# Patient Record
Sex: FEMALE | Race: WHITE | Employment: OTHER | URBAN - NONMETROPOLITAN AREA
[De-identification: names, ages, dates, MRNs, and addresses within clinical notes are randomized per-mention and may not be internally consistent; named-entity substitution may affect disease eponyms.]

---

## 2017-05-22 ENCOUNTER — HOSPITAL ENCOUNTER (OUTPATIENT)
Dept: WOMENS IMAGING | Age: 77
Discharge: HOME | End: 2017-05-22
Payer: MEDICARE

## 2017-05-22 DIAGNOSIS — Z12.31 VISIT FOR SCREENING MAMMOGRAM: ICD-10-CM

## 2017-05-22 PROCEDURE — 77063 BREAST TOMOSYNTHESIS BI: CPT

## 2017-06-08 RX ORDER — GABAPENTIN 300 MG/1
300 CAPSULE ORAL DAILY
COMMUNITY
Start: 2017-05-07 | End: 2017-06-08 | Stop reason: SDUPTHER

## 2017-06-08 RX ORDER — GABAPENTIN 300 MG/1
300 CAPSULE ORAL DAILY
Qty: 90 CAPSULE | Refills: 3 | Status: SHIPPED | OUTPATIENT
Start: 2017-06-08 | End: 2017-11-03

## 2017-07-14 RX ORDER — OMEPRAZOLE 20 MG/1
40 CAPSULE, DELAYED RELEASE ORAL DAILY
COMMUNITY

## 2017-07-14 RX ORDER — SIMVASTATIN 20 MG
20 TABLET ORAL NIGHTLY
COMMUNITY

## 2017-07-14 RX ORDER — ENALAPRIL MALEATE AND HYDROCHLOROTHIAZIDE 10; 25 MG/1; MG/1
1 TABLET ORAL DAILY
COMMUNITY

## 2017-07-14 RX ORDER — FLUOXETINE 10 MG/1
10 CAPSULE ORAL DAILY
Status: ON HOLD | COMMUNITY
End: 2017-08-24

## 2017-08-08 ENCOUNTER — OFFICE VISIT (OUTPATIENT)
Dept: GASTROENTEROLOGY | Facility: CLINIC | Age: 77
End: 2017-08-08

## 2017-08-08 VITALS
HEIGHT: 65 IN | DIASTOLIC BLOOD PRESSURE: 70 MMHG | HEART RATE: 87 BPM | BODY MASS INDEX: 36.65 KG/M2 | WEIGHT: 220 LBS | SYSTOLIC BLOOD PRESSURE: 124 MMHG | TEMPERATURE: 98 F

## 2017-08-08 DIAGNOSIS — K62.5 RECTAL BLEEDING: Primary | ICD-10-CM

## 2017-08-08 DIAGNOSIS — K63.5 COLON POLYPS: ICD-10-CM

## 2017-08-08 PROCEDURE — 99214 OFFICE O/P EST MOD 30 MIN: CPT | Performed by: NURSE PRACTITIONER

## 2017-08-08 RX ORDER — GABAPENTIN 300 MG/1
300 CAPSULE ORAL 3 TIMES DAILY
COMMUNITY

## 2017-08-08 NOTE — PROGRESS NOTES
"Chief Complaint   Patient presents with   • Rectal Bleeding     had bright red blood in stool couple times last 2 months       Subjective     HPI    Bright red blood observed in stool twice over past 6 weeks.  Bleeding is new.  No associated abdominal pain.  No constipation or diarrhea at time of bleeding. Normal bowel habit described as moving every other day without difficulty.  Blood described as \"dripping in commode.\"  No weight loss.  No N/V.  No heartburn or indigestion.  Heartburn is controlled with use of Omeprazole.      CScope (Dr Chavez) 1/2016 tubular adenoma at 30 cm  Endoscopy (Dr Chavez) 1/2016 gastric polyps      Past Medical History:   Diagnosis Date   • Depression    • Hyperlipidemia    • Hypertension        Past Surgical History:   Procedure Laterality Date   • CHOLECYSTECTOMY     • COLONOSCOPY  01/19/2016    small benign-appering polyp at 30cm   • ENDOSCOPY  01/19/2016    gastric polyp's   • LAPAROSCOPIC TUBAL LIGATION         Outpatient Prescriptions Marked as Taking for the 8/8/17 encounter (Office Visit) with BE Cuello   Medication Sig Dispense Refill   • enalapril-hydrochlorothiazide (VASERETIC) 10-25 MG per tablet Take 1 tablet by mouth Daily.     • gabapentin (NEURONTIN) 300 MG capsule Take 300 mg by mouth 3 (Three) Times a Day.     • Multiple Vitamin (MULTI VITAMIN PO) Take  by mouth.     • omeprazole (priLOSEC) 20 MG capsule Take 40 mg by mouth Daily.     • simvastatin (ZOCOR) 20 MG tablet Take 20 mg by mouth Every Night.         No Known Allergies    Social History     Social History   • Marital status:      Spouse name: N/A   • Number of children: N/A   • Years of education: N/A     Occupational History   • Not on file.     Social History Main Topics   • Smoking status: Never Smoker   • Smokeless tobacco: Never Used   • Alcohol use No   • Drug use: No   • Sexual activity: Not on file     Other Topics Concern   • Not on file     Social History Narrative " "      Family History   Problem Relation Age of Onset   • Esophageal cancer Paternal Grandfather    • Colon cancer Neg Hx        Review of Systems   Constitutional: Negative for fatigue, fever and unexpected weight change.   HENT: Negative for hearing loss, sore throat and voice change.    Eyes: Negative for visual disturbance.   Respiratory: Negative for cough, shortness of breath and wheezing.    Cardiovascular: Negative for chest pain and palpitations.   Gastrointestinal: Positive for anal bleeding. Negative for abdominal pain, blood in stool and vomiting.   Endocrine: Negative for polydipsia and polyuria.   Genitourinary: Negative for difficulty urinating, dysuria, hematuria and urgency.   Musculoskeletal: Negative for joint swelling and myalgias.   Skin: Negative for color change, rash and wound.   Neurological: Negative for dizziness, tremors, seizures and syncope.   Hematological: Does not bruise/bleed easily.   Psychiatric/Behavioral: Negative for agitation and confusion. The patient is not nervous/anxious.        Objective     Vitals:    08/08/17 0936   BP: 124/70   Pulse: 87   Temp: 98 °F (36.7 °C)   Weight: 220 lb (99.8 kg)   Height: 65\" (165.1 cm)     Body mass index is 36.61 kg/(m^2).    Physical Exam   Constitutional: She is oriented to person, place, and time. She appears well-developed and well-nourished.   HENT:   Head: Normocephalic and atraumatic.   Eyes: Conjunctivae are normal. Pupils are equal, round, and reactive to light. No scleral icterus.   Neck: No JVD present. No thyroid mass and no thyromegaly present.   Cardiovascular: Normal rate, regular rhythm and normal heart sounds.  Exam reveals no gallop and no friction rub.    No murmur heard.  Pulmonary/Chest: Effort normal and breath sounds normal. No accessory muscle usage. No respiratory distress. She has no wheezes. She has no rales.   Abdominal: Soft. Bowel sounds are normal. She exhibits no distension, no ascites and no mass. There is no " splenomegaly or hepatomegaly. There is no tenderness. There is no rebound and no guarding.   Genitourinary:   Genitourinary Comments: Rectal-Did not examine   Musculoskeletal: Normal range of motion. She exhibits no edema.   Neurological: She is alert and oriented to person, place, and time.   Deemed a reliable historian, able to converse without difficulty and able to move all extremities without difficulty   Skin: Skin is warm and dry.   Psychiatric: She has a normal mood and affect. Her behavior is normal.       Imaging Results (most recent)     None          Assessment/Plan     Luda was seen today for rectal bleeding.    Diagnoses and all orders for this visit:    Rectal bleeding  -     Case Request; Standing  -     Implement Anesthesia Orders Day of Procedure; Standing  -     Obtain Informed Consent; Standing  -     Case Request    Colon polyps      COLONOSCOPY WITH ANESTHESIA (N/A)    Declined Golytely  Educated on Miralax/Gatorade prep    Advised pt to stop ASA day prior to procedure and to stop use of NSAIDs, Fish Oil, and MV 5 days prior to procedure.  Tylenol based products are ok to take.  Pt verbalized understanding.    All risks, benefits, alternatives, and indications of colonoscopy procedure have been discussed with the patient. Risks to include perforation of the colon requiring possible surgery or colostomy, risk of bleeding from biopsies or removal of colon tissue, possibility of missing a colon polyp or cancer, or adverse drug reaction.  Benefits to include the diagnosis and management of disease of the colon and rectum. Alternatives to include barium enema, radiographic evaluation, lab testing or no intervention. Pt verbalizes understanding and agrees to proceed with procedure.      There are no Patient Instructions on file for this visit.

## 2017-08-09 PROBLEM — K62.5 RECTAL BLEEDING: Status: ACTIVE | Noted: 2017-08-09

## 2017-08-24 ENCOUNTER — ANESTHESIA (OUTPATIENT)
Dept: GASTROENTEROLOGY | Facility: HOSPITAL | Age: 77
End: 2017-08-24

## 2017-08-24 ENCOUNTER — ANESTHESIA EVENT (OUTPATIENT)
Dept: GASTROENTEROLOGY | Facility: HOSPITAL | Age: 77
End: 2017-08-24

## 2017-08-24 ENCOUNTER — HOSPITAL ENCOUNTER (OUTPATIENT)
Facility: HOSPITAL | Age: 77
Setting detail: HOSPITAL OUTPATIENT SURGERY
Discharge: HOME | End: 2017-08-24
Attending: INTERNAL MEDICINE | Admitting: INTERNAL MEDICINE

## 2017-08-24 VITALS
DIASTOLIC BLOOD PRESSURE: 71 MMHG | HEART RATE: 73 BPM | SYSTOLIC BLOOD PRESSURE: 120 MMHG | BODY MASS INDEX: 38.65 KG/M2 | OXYGEN SATURATION: 97 % | RESPIRATION RATE: 20 BRPM | TEMPERATURE: 98.1 F | WEIGHT: 232 LBS | HEIGHT: 65 IN

## 2017-08-24 DIAGNOSIS — K62.5 RECTAL BLEEDING: ICD-10-CM

## 2017-08-24 PROCEDURE — 45378 DIAGNOSTIC COLONOSCOPY: CPT | Performed by: INTERNAL MEDICINE

## 2017-08-24 PROCEDURE — 25010000002 PROPOFOL 10 MG/ML EMULSION: Performed by: NURSE ANESTHETIST, CERTIFIED REGISTERED

## 2017-08-24 RX ORDER — SODIUM CHLORIDE 9 MG/ML
500 INJECTION, SOLUTION INTRAVENOUS CONTINUOUS PRN
Status: DISCONTINUED | OUTPATIENT
Start: 2017-08-24 | End: 2017-08-24 | Stop reason: HOSPADM

## 2017-08-24 RX ORDER — LIDOCAINE HYDROCHLORIDE 20 MG/ML
INJECTION, SOLUTION INFILTRATION; PERINEURAL AS NEEDED
Status: DISCONTINUED | OUTPATIENT
Start: 2017-08-24 | End: 2017-08-24 | Stop reason: SURG

## 2017-08-24 RX ORDER — LIDOCAINE HYDROCHLORIDE 10 MG/ML
0.5 INJECTION, SOLUTION INFILTRATION; PERINEURAL ONCE AS NEEDED
Status: DISCONTINUED | OUTPATIENT
Start: 2017-08-24 | End: 2017-08-24

## 2017-08-24 RX ORDER — SODIUM CHLORIDE 9 MG/ML
100 INJECTION, SOLUTION INTRAVENOUS CONTINUOUS
Status: CANCELLED | OUTPATIENT
Start: 2017-08-24

## 2017-08-24 RX ORDER — SODIUM CHLORIDE 0.9 % (FLUSH) 0.9 %
1-10 SYRINGE (ML) INJECTION AS NEEDED
Status: CANCELLED | OUTPATIENT
Start: 2017-08-24

## 2017-08-24 RX ORDER — PROPOFOL 10 MG/ML
VIAL (ML) INTRAVENOUS AS NEEDED
Status: DISCONTINUED | OUTPATIENT
Start: 2017-08-24 | End: 2017-08-24 | Stop reason: SURG

## 2017-08-24 RX ORDER — SODIUM CHLORIDE 0.9 % (FLUSH) 0.9 %
3 SYRINGE (ML) INJECTION AS NEEDED
Status: DISCONTINUED | OUTPATIENT
Start: 2017-08-24 | End: 2017-08-24 | Stop reason: HOSPADM

## 2017-08-24 RX ADMIN — PROPOFOL 200 MG: 10 INJECTION, EMULSION INTRAVENOUS at 09:32

## 2017-08-24 RX ADMIN — SODIUM CHLORIDE 500 ML: 9 INJECTION, SOLUTION INTRAVENOUS at 08:36

## 2017-08-24 RX ADMIN — LIDOCAINE HYDROCHLORIDE 50 MG: 20 INJECTION, SOLUTION INFILTRATION; PERINEURAL at 09:32

## 2017-08-24 NOTE — PLAN OF CARE
Problem: Patient Care Overview (Adult)  Goal: Adult Individualization and Mutuality  Outcome: Ongoing (interventions implemented as appropriate)    08/24/17 0806   Individualization   Patient Specific Preferences none   Patient Specific Goals none   Patient Specific Interventions none   Mutuality/Individual Preferences   What Anxieties, Fears or Concerns Do You Have About Your Health or Care? none   What Questions Do You Have About Your Health or Care? none   What Information Would Help Us Give You More Personalized Care? none

## 2017-08-24 NOTE — ANESTHESIA PREPROCEDURE EVALUATION
Anesthesia Evaluation     Patient summary reviewed and Nursing notes reviewed   no history of anesthetic complications:  NPO Solid Status: > 8 hours  NPO Liquid Status: > 8 hours     Airway   Mallampati: III  TM distance: >3 FB  Neck ROM: full  Dental    (+) lower dentures and upper dentures    Pulmonary - normal exam    breath sounds clear to auscultation  (-) asthma, recent URI, sleep apnea, not a smoker  Cardiovascular   Exercise tolerance: good (4-7 METS)    Rhythm: regular  Rate: normal    (+) hypertension well controlled, hyperlipidemia  (-) past MI, angina, murmur, cardiac stents      Neuro/Psych  (-) seizures, TIA, CVA  GI/Hepatic/Renal/Endo    (+) obesity, morbid obesity, GERD well controlled,   (-) liver disease, no renal disease, diabetes, hypothyroidism, hyperthyroidism    Musculoskeletal     (-) neck pain, neck stiffness  Abdominal    Substance History      OB/GYN          Other                                        Anesthesia Plan    ASA 3     general   total IV anesthesia  intravenous induction   Anesthetic plan and risks discussed with patient.

## 2017-08-24 NOTE — PLAN OF CARE
Problem: Patient Care Overview (Adult)  Goal: Plan of Care Review  Outcome: Outcome(s) achieved Date Met:  08/24/17 08/24/17 0953   Patient Care Overview   Progress improving   Outcome Evaluation   Outcome Summary/Follow up Plan DISCHARGE CRITERIA MET   Coping/Psychosocial Response Interventions   Plan Of Care Reviewed With patient;spouse

## 2017-08-24 NOTE — PLAN OF CARE
Problem: Patient Care Overview (Adult)  Goal: Plan of Care Review  Outcome: Ongoing (interventions implemented as appropriate)    08/24/17 0933   Patient Care Overview   Progress improving   Outcome Evaluation   Outcome Summary/Follow up Plan no noted problems

## 2017-08-24 NOTE — PLAN OF CARE
Problem: GI Endoscopy (Adult)  Goal: Signs and Symptoms of Listed Potential Problems Will be Absent or Manageable (GI Endoscopy)  Outcome: Outcome(s) achieved Date Met:  08/24/17 08/24/17 0994   GI Endoscopy   Problems Assessed (GI Endoscopy) all   Problems Present (GI Endoscopy) none

## 2017-08-24 NOTE — ANESTHESIA POSTPROCEDURE EVALUATION
Patient: Luda Walter    Procedure Summary     Date Anesthesia Start Anesthesia Stop Room / Location    08/24/17 0930 0945  [facility] /  [facility]       Procedure Diagnosis Surgeon Provider    COLONOSCOPY WITH ANESTHESIA (N/A ) Rectal bleeding  (Rectal bleeding [K62.5]) Onel Chavez, DO Jayce Johns, MARGIE          Anesthesia Type: general  Last vitals  BP   120/71 (08/24/17 1017)    Temp        Pulse   73 (08/24/17 1017)   Resp   20 (08/24/17 1017)    SpO2   97 % (08/24/17 1017)      Post Anesthesia Care and Evaluation    Patient location during evaluation: PHASE II  Patient participation: complete - patient participated  Level of consciousness: awake and alert  Pain management: adequate  Airway patency: patent  Anesthetic complications: No anesthetic complications  PONV Status: none  Cardiovascular status: acceptable  Respiratory status: acceptable  Hydration status: acceptable

## 2017-08-25 ENCOUNTER — TELEPHONE (OUTPATIENT)
Dept: GASTROENTEROLOGY | Facility: CLINIC | Age: 77
End: 2017-08-25

## 2017-10-17 RX ORDER — SIMVASTATIN 20 MG
20 TABLET ORAL NIGHTLY
COMMUNITY
End: 2018-06-25 | Stop reason: SDUPTHER

## 2017-10-17 RX ORDER — OMEPRAZOLE 20 MG/1
20 CAPSULE, DELAYED RELEASE ORAL 2 TIMES DAILY
COMMUNITY
End: 2017-11-03 | Stop reason: SDUPTHER

## 2017-10-17 RX ORDER — ENALAPRIL MALEATE AND HYDROCHLOROTHIAZIDE 10; 25 MG/1; MG/1
1 TABLET ORAL 2 TIMES DAILY
COMMUNITY
End: 2018-07-10 | Stop reason: SDUPTHER

## 2017-10-23 DIAGNOSIS — E78.2 MIXED HYPERLIPIDEMIA: Primary | ICD-10-CM

## 2017-10-26 DIAGNOSIS — E78.2 MIXED HYPERLIPIDEMIA: ICD-10-CM

## 2017-10-26 LAB
ALBUMIN SERPL-MCNC: 4 G/DL (ref 3.5–5.2)
ALP BLD-CCNC: 58 U/L (ref 35–104)
ALT SERPL-CCNC: 27 U/L (ref 5–33)
ANION GAP SERPL CALCULATED.3IONS-SCNC: 12 MMOL/L (ref 7–19)
AST SERPL-CCNC: 21 U/L (ref 5–32)
BILIRUB SERPL-MCNC: 0.4 MG/DL (ref 0.2–1.2)
BUN BLDV-MCNC: 13 MG/DL (ref 8–23)
CALCIUM SERPL-MCNC: 9.4 MG/DL (ref 8.8–10.2)
CHLORIDE BLD-SCNC: 96 MMOL/L (ref 98–111)
CO2: 32 MMOL/L (ref 22–29)
CREAT SERPL-MCNC: 0.6 MG/DL (ref 0.5–0.9)
GFR NON-AFRICAN AMERICAN: >60
GLUCOSE BLD-MCNC: 104 MG/DL (ref 74–109)
HCT VFR BLD CALC: 43.2 % (ref 37–47)
HEMOGLOBIN: 13.9 G/DL (ref 12–16)
LDL CHOLESTEROL DIRECT: 105 MG/DL
MCH RBC QN AUTO: 28.4 PG (ref 27–31)
MCHC RBC AUTO-ENTMCNC: 32.2 G/DL (ref 33–37)
MCV RBC AUTO: 88.3 FL (ref 81–99)
PDW BLD-RTO: 13.8 % (ref 11.5–14.5)
PLATELET # BLD: 298 K/UL (ref 130–400)
PMV BLD AUTO: 10 FL (ref 9.4–12.3)
POTASSIUM SERPL-SCNC: 3.7 MMOL/L (ref 3.5–5)
RBC # BLD: 4.89 M/UL (ref 4.2–5.4)
SODIUM BLD-SCNC: 140 MMOL/L (ref 136–145)
TOTAL PROTEIN: 7.1 G/DL (ref 6.6–8.7)
WBC # BLD: 7.6 K/UL (ref 4.8–10.8)

## 2017-11-01 ENCOUNTER — TELEPHONE (OUTPATIENT)
Dept: INTERNAL MEDICINE | Age: 77
End: 2017-11-01

## 2017-11-01 DIAGNOSIS — R41.0 CONFUSION: Primary | ICD-10-CM

## 2017-11-01 NOTE — TELEPHONE ENCOUNTER
She does have a complicated psychiatric history.  I think it might be best for to get her into see .               She does have a compensated psychiatric history, would probably be best to get her set up to see psychiatry at [facility], Dr. Chayo Hudson and associates - PSAP

## 2017-11-03 ENCOUNTER — OFFICE VISIT (OUTPATIENT)
Dept: INTERNAL MEDICINE | Age: 77
End: 2017-11-03
Payer: MEDICARE

## 2017-11-03 VITALS
RESPIRATION RATE: 18 BRPM | WEIGHT: 237 LBS | HEART RATE: 96 BPM | SYSTOLIC BLOOD PRESSURE: 112 MMHG | BODY MASS INDEX: 38.09 KG/M2 | OXYGEN SATURATION: 97 % | HEIGHT: 66 IN | DIASTOLIC BLOOD PRESSURE: 78 MMHG

## 2017-11-03 DIAGNOSIS — G60.9 IDIOPATHIC PERIPHERAL NEUROPATHY: ICD-10-CM

## 2017-11-03 DIAGNOSIS — E78.2 MIXED HYPERLIPIDEMIA: ICD-10-CM

## 2017-11-03 DIAGNOSIS — F51.01 PRIMARY INSOMNIA: ICD-10-CM

## 2017-11-03 DIAGNOSIS — Z23 NEED FOR INFLUENZA VACCINATION: ICD-10-CM

## 2017-11-03 DIAGNOSIS — Z78.0 POST-MENOPAUSAL: ICD-10-CM

## 2017-11-03 DIAGNOSIS — K21.9 GASTROESOPHAGEAL REFLUX DISEASE WITHOUT ESOPHAGITIS: ICD-10-CM

## 2017-11-03 DIAGNOSIS — R41.0 EPISODIC CONFUSION: ICD-10-CM

## 2017-11-03 DIAGNOSIS — I10 ESSENTIAL HYPERTENSION: Primary | ICD-10-CM

## 2017-11-03 PROCEDURE — G8400 PT W/DXA NO RESULTS DOC: HCPCS | Performed by: NURSE PRACTITIONER

## 2017-11-03 PROCEDURE — 1123F ACP DISCUSS/DSCN MKR DOCD: CPT | Performed by: NURSE PRACTITIONER

## 2017-11-03 PROCEDURE — G8417 CALC BMI ABV UP PARAM F/U: HCPCS | Performed by: NURSE PRACTITIONER

## 2017-11-03 PROCEDURE — G8427 DOCREV CUR MEDS BY ELIG CLIN: HCPCS | Performed by: NURSE PRACTITIONER

## 2017-11-03 PROCEDURE — 4040F PNEUMOC VAC/ADMIN/RCVD: CPT | Performed by: NURSE PRACTITIONER

## 2017-11-03 PROCEDURE — G0008 ADMIN INFLUENZA VIRUS VAC: HCPCS | Performed by: NURSE PRACTITIONER

## 2017-11-03 PROCEDURE — 90662 IIV NO PRSV INCREASED AG IM: CPT | Performed by: NURSE PRACTITIONER

## 2017-11-03 PROCEDURE — G8484 FLU IMMUNIZE NO ADMIN: HCPCS | Performed by: NURSE PRACTITIONER

## 2017-11-03 PROCEDURE — 1090F PRES/ABSN URINE INCON ASSESS: CPT | Performed by: NURSE PRACTITIONER

## 2017-11-03 PROCEDURE — 99214 OFFICE O/P EST MOD 30 MIN: CPT | Performed by: NURSE PRACTITIONER

## 2017-11-03 PROCEDURE — 1036F TOBACCO NON-USER: CPT | Performed by: NURSE PRACTITIONER

## 2017-11-03 RX ORDER — RANITIDINE 150 MG/1
150 TABLET ORAL 2 TIMES DAILY
Qty: 60 TABLET | Refills: 3 | Status: SHIPPED | OUTPATIENT
Start: 2017-11-03 | End: 2018-02-25 | Stop reason: SDUPTHER

## 2017-11-03 RX ORDER — QUETIAPINE FUMARATE 25 MG/1
TABLET, FILM COATED ORAL
Qty: 90 TABLET | Refills: 1 | Status: SHIPPED | OUTPATIENT
Start: 2017-11-03 | End: 2017-12-05 | Stop reason: SDUPTHER

## 2017-11-03 ASSESSMENT — PATIENT HEALTH QUESTIONNAIRE - PHQ9
2. FEELING DOWN, DEPRESSED OR HOPELESS: 0
SUM OF ALL RESPONSES TO PHQ QUESTIONS 1-9: 0
1. LITTLE INTEREST OR PLEASURE IN DOING THINGS: 0
SUM OF ALL RESPONSES TO PHQ9 QUESTIONS 1 & 2: 0

## 2017-11-03 ASSESSMENT — ENCOUNTER SYMPTOMS
ABDOMINAL DISTENTION: 0
COUGH: 0
SORE THROAT: 0
TROUBLE SWALLOWING: 0
DIARRHEA: 0
EYE DISCHARGE: 0
ABDOMINAL PAIN: 0
BLOOD IN STOOL: 0
CHOKING: 0
SHORTNESS OF BREATH: 0
COLOR CHANGE: 0
STRIDOR: 0
EYE ITCHING: 0
CONSTIPATION: 0
WHEEZING: 0
VOMITING: 0
NAUSEA: 0

## 2017-11-03 NOTE — PATIENT INSTRUCTIONS
#1 hypertension continue on the same medications monitor blood pressure a couple times a week. #2 Insomnia;  we will try to switch her medications to help with the drains in the gabapentin may be what is still in the  #3 idiopathic neuropathy . She says she has no pain burning no rub description of neuropathy we will try a trial off of that. She may realize that she is hurting more than she really realizes when she stops the medication  Take gabapentin 300 mg every other night for a week, then take mwf then stop  #4 confusion mostly in the afternoon as reported by her daughter  Seroquel;  Start 25 mg at 5 pm every day and 25 mg every night for a week  Then continue the 25 mg at night  then increase to 50 at night; Let me know how you are doing in a couple of weeks;   #5 GERD on twice a day PPIs we will transition her off of that and add the Zantac which she started taking  #6 hyperlipidemia is slightly elevated but she has been taking her Zocor in the mornings. Instructions for nighttime dosing of Zocor    Her daughter Chantale Siddiqi is with her today we have gone over all these medications and the instructions. Suggestion is with her to get a pillbox to help her mother with her medications and her father can help her as well. Miss Marisela Damon is agreeable to this and she is agreeable to the medicine changes above.    I will discuss referral to [facility] with her daughter on the phone later today ;  Did not think she would be receptive to that in the office right now with all the med changes;

## 2017-11-03 NOTE — PROGRESS NOTES
suicide attempt, years ago     Mixed hyperlipidemia     Obesity       Past Surgical History:   Procedure Laterality Date    CHOLECYSTECTOMY      COLONOSCOPY  08/24/2017    Dr Beth Pak repeat 5 years, diverticulosis       Family History   Problem Relation Age of Onset    COPD Mother     Diabetes Father        Social History   Substance Use Topics    Smoking status: Never Smoker    Smokeless tobacco: Never Used    Alcohol use No      Current Outpatient Prescriptions   Medication Sig Dispense Refill    ranitidine (ZANTAC) 150 MG tablet Take 1 tablet by mouth 2 times daily 60 tablet 3    QUEtiapine (SEROQUEL) 25 MG tablet Take one tablet with supper then one at bedtime for a week Then one tablet with supper then 2 at bedtime; 90 tablet 1    calcium carbonate-vitamin D (CALCIUM 600 + D) 600-400 MG-UNIT TABS per tab Take 1 tablet by mouth 2 times daily      enalapril-hydrochlorothiazide (VASERETIC) 10-25 MG per tablet Take 1 tablet by mouth 2 times daily      simvastatin (ZOCOR) 20 MG tablet Take 20 mg by mouth nightly       No current facility-administered medications for this visit. No Known Allergies    Health Maintenance   Topic Date Due    DTaP/Tdap/Td vaccine (1 - Tdap) 09/05/1959    Zostavax vaccine  09/05/2001    DEXA (modify frequency per FRAX score)  09/05/2006    Lipid screen  10/26/2022    Flu vaccine  Completed    Pneumococcal low/med risk  Completed       Subjective:      Review of Systems   Constitutional: Negative for fatigue, fever and unexpected weight change. HENT: Negative for ear discharge, ear pain, mouth sores, sore throat and trouble swallowing. Eyes: Negative for discharge, itching and visual disturbance. Respiratory: Negative for cough, choking, shortness of breath, wheezing and stridor. Cardiovascular: Negative for chest pain, palpitations and leg swelling.    Gastrointestinal: Negative for abdominal distention, abdominal pain, blood in stool, constipation, diarrhea, nausea and vomiting. Endocrine: Negative for cold intolerance, polydipsia and polyuria. Genitourinary: Negative for difficulty urinating, dysuria, frequency and urgency. Musculoskeletal: Negative for arthralgias and gait problem. Skin: Negative for color change and rash. Allergic/Immunologic: Negative for food allergies and immunocompromised state. Neurological: Negative for dizziness, tremors, syncope, speech difficulty, weakness and headaches. Hematological: Negative for adenopathy. Does not bruise/bleed easily. Psychiatric/Behavioral: Positive for confusion (more in the afternoons) and sleep disturbance (insomniahas been having some bad dreams ). Negative for hallucinations. Objective:     Physical Exam   Constitutional: She is oriented to person, place, and time. She appears well-developed and well-nourished. No distress. HENT:   Head: Normocephalic and atraumatic. Eyes: Pupils are equal, round, and reactive to light. Right eye exhibits no discharge. Left eye exhibits no discharge. No scleral icterus. Neck: Normal range of motion. Neck supple. No JVD present. No thyromegaly present. Cardiovascular: Normal rate, regular rhythm and normal heart sounds. No murmur heard. Pulmonary/Chest: Effort normal and breath sounds normal. No respiratory distress. She has no wheezes. She has no rales. Abdominal: Soft. Bowel sounds are normal. She exhibits no distension and no mass. There is no tenderness. There is no rebound and no guarding. Musculoskeletal: Normal range of motion. She exhibits no edema or tenderness. Neurological: She is alert and oriented to person, place, and time. She has normal reflexes. No cranial nerve deficit. Coordination normal.   Skin: Skin is warm and dry. No rash noted. No erythema. Psychiatric: She has a normal mood and affect. Her behavior is normal. Judgment and thought content normal. She does not exhibit a depressed mood.      /78

## 2017-11-06 NOTE — TELEPHONE ENCOUNTER
Cheryromero Tan spoke with her daughter and she agrees to take her to see Dr Olivia Valdez. I have put in a referral to her;     But they need to call her daughter [phone] with the appt

## 2017-12-01 ENCOUNTER — HOSPITAL ENCOUNTER (OUTPATIENT)
Dept: WOMENS IMAGING | Age: 77
Discharge: HOME | End: 2017-12-01
Payer: MEDICARE

## 2017-12-01 DIAGNOSIS — Z78.0 POST-MENOPAUSAL: ICD-10-CM

## 2017-12-01 PROCEDURE — 77080 DXA BONE DENSITY AXIAL: CPT

## 2017-12-05 RX ORDER — QUETIAPINE FUMARATE 25 MG/1
TABLET, FILM COATED ORAL
Qty: 120 TABLET | Refills: 1 | Status: SHIPPED | OUTPATIENT
Start: 2017-12-05 | End: 2017-12-13 | Stop reason: SDUPTHER

## 2017-12-05 NOTE — TELEPHONE ENCOUNTER
Daughter states she takes one in the morning and 2 in the afternoon, she said she is better, crying is less and she is resting better, is more alert etc, but she is still crying some still, she thinks she needs a little more to help with the crying, she seems more focused.

## 2017-12-05 NOTE — TELEPHONE ENCOUNTER
They could try taking 2 in the morning and 2 in the afternoon. At some point and may want to add 1 at bedtime.  I just want to make her too sleepy

## 2017-12-06 ENCOUNTER — TELEPHONE (OUTPATIENT)
Dept: INTERNAL MEDICINE | Age: 77
End: 2017-12-06

## 2017-12-08 RX ORDER — OMEPRAZOLE 20 MG/1
CAPSULE, DELAYED RELEASE ORAL
Refills: 3 | COMMUNITY
Start: 2017-11-13 | End: 2018-05-07 | Stop reason: SDUPTHER

## 2017-12-13 RX ORDER — QUETIAPINE FUMARATE 50 MG/1
TABLET, FILM COATED ORAL
Qty: 60 TABLET | Refills: 1 | Status: SHIPPED | OUTPATIENT
Start: 2017-12-13 | End: 2018-04-26 | Stop reason: SDUPTHER

## 2017-12-13 NOTE — TELEPHONE ENCOUNTER
Talked with [facility] pharmacy her insurance will not pay for 4 tablets daily they will pay for 50mg bid, so we changed this to twice daily

## 2017-12-21 ENCOUNTER — TELEPHONE (OUTPATIENT)
Dept: INTERNAL MEDICINE | Age: 77
End: 2017-12-21

## 2017-12-21 NOTE — TELEPHONE ENCOUNTER
Spoke with daughter:  Patient's insurance will not pay for an increase in pills in Quetiapine, so she was wondering if we can increase the dosage instead.   12/21 1:40

## 2017-12-26 NOTE — TELEPHONE ENCOUNTER
Called and spoke with Jaya Jansen. Let her know this. She verbalized understanding. It looks as though rx for 50 mg bid was sent in on 12/14 for pt. She is going to speak with pharmacy to ensure that they have what is needed and will call us back if they need a new rx.

## 2018-03-22 ENCOUNTER — OFFICE VISIT (OUTPATIENT)
Dept: INTERNAL MEDICINE | Age: 78
End: 2018-03-22
Payer: MEDICARE

## 2018-03-22 VITALS
SYSTOLIC BLOOD PRESSURE: 122 MMHG | HEIGHT: 65 IN | TEMPERATURE: 96.4 F | OXYGEN SATURATION: 95 % | DIASTOLIC BLOOD PRESSURE: 80 MMHG | HEART RATE: 94 BPM

## 2018-03-22 DIAGNOSIS — F51.01 PRIMARY INSOMNIA: ICD-10-CM

## 2018-03-22 DIAGNOSIS — L03.115 CELLULITIS OF RIGHT LOWER EXTREMITY: Primary | ICD-10-CM

## 2018-03-22 PROCEDURE — 1123F ACP DISCUSS/DSCN MKR DOCD: CPT | Performed by: PHYSICIAN ASSISTANT

## 2018-03-22 PROCEDURE — 1036F TOBACCO NON-USER: CPT | Performed by: PHYSICIAN ASSISTANT

## 2018-03-22 PROCEDURE — 4040F PNEUMOC VAC/ADMIN/RCVD: CPT | Performed by: PHYSICIAN ASSISTANT

## 2018-03-22 PROCEDURE — G8399 PT W/DXA RESULTS DOCUMENT: HCPCS | Performed by: PHYSICIAN ASSISTANT

## 2018-03-22 PROCEDURE — G8417 CALC BMI ABV UP PARAM F/U: HCPCS | Performed by: PHYSICIAN ASSISTANT

## 2018-03-22 PROCEDURE — 99213 OFFICE O/P EST LOW 20 MIN: CPT | Performed by: PHYSICIAN ASSISTANT

## 2018-03-22 PROCEDURE — 1090F PRES/ABSN URINE INCON ASSESS: CPT | Performed by: PHYSICIAN ASSISTANT

## 2018-03-22 PROCEDURE — G8482 FLU IMMUNIZE ORDER/ADMIN: HCPCS | Performed by: PHYSICIAN ASSISTANT

## 2018-03-22 PROCEDURE — G8427 DOCREV CUR MEDS BY ELIG CLIN: HCPCS | Performed by: PHYSICIAN ASSISTANT

## 2018-03-22 RX ORDER — TRAZODONE HYDROCHLORIDE 50 MG/1
50 TABLET ORAL NIGHTLY
Qty: 30 TABLET | Refills: 1 | Status: SHIPPED | OUTPATIENT
Start: 2018-03-22 | End: 2018-05-07 | Stop reason: SDUPTHER

## 2018-03-22 RX ORDER — CEPHALEXIN 500 MG/1
500 CAPSULE ORAL 4 TIMES DAILY
Qty: 40 CAPSULE | Refills: 0 | Status: SHIPPED | OUTPATIENT
Start: 2018-03-22 | End: 2018-05-07 | Stop reason: ALTCHOICE

## 2018-03-22 ASSESSMENT — ENCOUNTER SYMPTOMS
PHOTOPHOBIA: 0
WHEEZING: 0
NAUSEA: 0
CHEST TIGHTNESS: 0
EYE REDNESS: 0
SHORTNESS OF BREATH: 0
VOMITING: 0
COUGH: 0
ABDOMINAL PAIN: 0
COLOR CHANGE: 0
RHINORRHEA: 0
CONSTIPATION: 0
DIARRHEA: 0
SORE THROAT: 0
SINUS PRESSURE: 0
EYE PAIN: 0

## 2018-03-22 NOTE — PROGRESS NOTES
[facility]  [address]  [address] [place] [id number]  Dept: [phone]  Dept Fax: [phone]  Loc: [phone]      [facility]  OFFICE NOTE      Chief Complaint   Patient presents with    Other     Spot on right lower ankle been there for abt 2 weeks        Mellisa Munoz is a 69y.o. year old female who is seen for evaluation of Cellulitis of the right foot and insomnia. Patient states for last 2 weeks or so she's had some redness and warmth around her ankle around to the back. Patient states had a small blister on the back of her foot also. Patient states she wears a brace not sure if the brace was causing friction calls a friction blister. Patient states has some clear drainage from the blister. Patient has some warmth around the area. Patient denies any specific pain. Patient denies any specific injury that she can think of. Patient states is been putting Lamisil on it is thought might be a fungal infection. Patient states it does itch at times. Patient also states that she's having some difficulty sleeping. Patient states she didn't like to take Seroquel at bedtime because it gives her bad dreams. Patient states she's taken trazodone in the past and it does seem to help with sleep.     Active Ambulatory Problems     Diagnosis Date Noted    Hypertension     Esophageal reflux     Idiopathic peripheral neuropathy     IFG (impaired fasting glucose)     Obesity     Mixed hyperlipidemia      Resolved Ambulatory Problems     Diagnosis Date Noted    No Resolved Ambulatory Problems     Past Medical History:   Diagnosis Date    Biliary dyskinesia     Esophageal reflux     Hypertension     Idiopathic peripheral neuropathy     IFG (impaired fasting glucose)     Insomnia     Major depressive disorder     Mixed hyperlipidemia     Obesity        Past Medical History:   Diagnosis Date    Biliary dyskinesia     Esophageal reflux     Hypertension     Idiopathic peripheral neuropathy     right foot drop, atrophy    IFG (impaired fasting glucose)     Insomnia     Major depressive disorder     history of suicide attempt, years ago     Mixed hyperlipidemia     Obesity        Prior to Visit Medications    Medication Sig Taking? Authorizing Provider   traZODone (DESYREL) 50 MG tablet Take 1 tablet by mouth nightly Yes ISATU Gomez   cephALEXin (KEFLEX) 500 MG capsule Take 1 capsule by mouth 4 times daily Yes ISATU Gomez   ranitidine (ZANTAC) 150 MG tablet TAKE 1 TABLET BY MOUTH TWICE DAILY Yes Cam Louis MD   QUEtiapine (SEROQUEL) 50 MG tablet Take 1 tablets twice daily Yes Cam Louis MD   omeprazole (PRILOSEC) 20 MG delayed release capsule TK 1 C PO BID Yes Historical Provider, MD   calcium carbonate-vitamin D (CALCIUM 600 + D) 600-400 MG-UNIT TABS per tab Take 1 tablet by mouth 2 times daily Yes Historical Provider, MD   enalapril-hydrochlorothiazide (VASERETIC) 10-25 MG per tablet Take 1 tablet by mouth 2 times daily Yes Historical Provider, MD   simvastatin (ZOCOR) 20 MG tablet Take 20 mg by mouth nightly Yes Historical Provider, MD       Past Surgical History:   Procedure Laterality Date    CHOLECYSTECTOMY      COLONOSCOPY  08/24/2017    Dr Cally Arnold repeat 5 years, diverticulosis       Family History   Problem Relation Age of Onset    COPD Mother     Diabetes Father        No Known Allergies    Social History     Social History    Marital status:      Spouse name: N/A    Number of children: N/A    Years of education: N/A     Occupational History    Not on file.      Social History Main Topics    Smoking status: Never Smoker    Smokeless tobacco: Never Used    Alcohol use No    Drug use: No    Sexual activity: Not on file     Other Topics Concern    Not on file     Social History Narrative    No narrative on file       Review of Systems  Review of Systems   Constitutional:

## 2018-04-26 RX ORDER — QUETIAPINE FUMARATE 50 MG/1
TABLET, FILM COATED ORAL
Qty: 60 TABLET | Refills: 0 | Status: SHIPPED | OUTPATIENT
Start: 2018-04-26 | End: 2018-05-07 | Stop reason: SDUPTHER

## 2018-04-27 NOTE — TELEPHONE ENCOUNTER
Pt's daughter called stating that pt is having some problems with emotional issues, forgetfulness, confusion, agitation. States that she seems fine in the am, but around 1 or 2 in the afternoon, it starts and she gets increasingly hard to deal with through the afternoon and night. She wonders if there is some medicine for \"sundowners\" syndrome that she might could take to help. Pt has had suicide attempts in the past and her daughter is very cautious and concerned about the best way to approach this issue with pt. Pt does have an appointment with Mary Summers on Friday. Please advise. unknown

## 2018-05-07 ENCOUNTER — OFFICE VISIT (OUTPATIENT)
Dept: INTERNAL MEDICINE | Age: 78
End: 2018-05-07
Payer: MEDICARE

## 2018-05-07 VITALS
HEART RATE: 102 BPM | BODY MASS INDEX: 37.82 KG/M2 | HEIGHT: 65 IN | OXYGEN SATURATION: 96 % | SYSTOLIC BLOOD PRESSURE: 138 MMHG | DIASTOLIC BLOOD PRESSURE: 72 MMHG | WEIGHT: 227 LBS | RESPIRATION RATE: 16 BRPM

## 2018-05-07 DIAGNOSIS — I10 ESSENTIAL HYPERTENSION: ICD-10-CM

## 2018-05-07 DIAGNOSIS — Z00.00 HEALTH CARE MAINTENANCE: ICD-10-CM

## 2018-05-07 DIAGNOSIS — E78.2 MIXED HYPERLIPIDEMIA: ICD-10-CM

## 2018-05-07 DIAGNOSIS — F51.01 PRIMARY INSOMNIA: Primary | ICD-10-CM

## 2018-05-07 DIAGNOSIS — K21.9 GASTROESOPHAGEAL REFLUX DISEASE WITHOUT ESOPHAGITIS: ICD-10-CM

## 2018-05-07 DIAGNOSIS — E55.9 VITAMIN D DEFICIENCY: ICD-10-CM

## 2018-05-07 PROCEDURE — 4040F PNEUMOC VAC/ADMIN/RCVD: CPT | Performed by: NURSE PRACTITIONER

## 2018-05-07 PROCEDURE — G8399 PT W/DXA RESULTS DOCUMENT: HCPCS | Performed by: NURSE PRACTITIONER

## 2018-05-07 PROCEDURE — 1036F TOBACCO NON-USER: CPT | Performed by: NURSE PRACTITIONER

## 2018-05-07 PROCEDURE — G8427 DOCREV CUR MEDS BY ELIG CLIN: HCPCS | Performed by: NURSE PRACTITIONER

## 2018-05-07 PROCEDURE — G8417 CALC BMI ABV UP PARAM F/U: HCPCS | Performed by: NURSE PRACTITIONER

## 2018-05-07 PROCEDURE — 1123F ACP DISCUSS/DSCN MKR DOCD: CPT | Performed by: NURSE PRACTITIONER

## 2018-05-07 PROCEDURE — 1090F PRES/ABSN URINE INCON ASSESS: CPT | Performed by: NURSE PRACTITIONER

## 2018-05-07 PROCEDURE — 99214 OFFICE O/P EST MOD 30 MIN: CPT | Performed by: NURSE PRACTITIONER

## 2018-05-07 RX ORDER — TRAZODONE HYDROCHLORIDE 50 MG/1
100 TABLET ORAL NIGHTLY
Qty: 30 TABLET | Refills: 1 | Status: SHIPPED | OUTPATIENT
Start: 2018-05-07 | End: 2018-08-13 | Stop reason: SDUPTHER

## 2018-05-07 RX ORDER — QUETIAPINE FUMARATE 50 MG/1
25 TABLET, FILM COATED ORAL 2 TIMES DAILY
Qty: 60 TABLET | Refills: 0 | Status: SHIPPED | OUTPATIENT
Start: 2018-05-07 | End: 2018-07-16 | Stop reason: SDUPTHER

## 2018-05-07 RX ORDER — OMEPRAZOLE 20 MG/1
CAPSULE, DELAYED RELEASE ORAL
Qty: 180 CAPSULE | Refills: 0 | Status: SHIPPED | OUTPATIENT
Start: 2018-05-07 | End: 2019-08-06

## 2018-05-07 ASSESSMENT — ENCOUNTER SYMPTOMS
STRIDOR: 0
ABDOMINAL PAIN: 0
EYE ITCHING: 0
CONSTIPATION: 0
DIARRHEA: 0
VOMITING: 0
SORE THROAT: 0
NAUSEA: 0
SHORTNESS OF BREATH: 0
WHEEZING: 0
BLOOD IN STOOL: 0
CHOKING: 0
ABDOMINAL DISTENTION: 0
TROUBLE SWALLOWING: 0
COUGH: 0
EYE DISCHARGE: 0
COLOR CHANGE: 0

## 2018-07-10 RX ORDER — ENALAPRIL MALEATE AND HYDROCHLOROTHIAZIDE 10; 25 MG/1; MG/1
1 TABLET ORAL 2 TIMES DAILY
Qty: 60 TABLET | Refills: 1 | Status: SHIPPED | OUTPATIENT
Start: 2018-07-10 | End: 2018-10-03 | Stop reason: SDUPTHER

## 2018-07-16 ENCOUNTER — TELEPHONE (OUTPATIENT)
Dept: INTERNAL MEDICINE | Age: 78
End: 2018-07-16

## 2018-07-16 RX ORDER — QUETIAPINE FUMARATE 50 MG/1
50 TABLET, FILM COATED ORAL DAILY
Qty: 30 TABLET | Refills: 2 | Status: SHIPPED | OUTPATIENT
Start: 2018-07-16 | End: 2018-11-02 | Stop reason: SDUPTHER

## 2018-08-13 ENCOUNTER — TELEPHONE (OUTPATIENT)
Dept: INTERNAL MEDICINE | Age: 78
End: 2018-08-13

## 2018-08-13 DIAGNOSIS — F51.01 PRIMARY INSOMNIA: ICD-10-CM

## 2018-08-13 RX ORDER — TRAZODONE HYDROCHLORIDE 50 MG/1
100 TABLET ORAL NIGHTLY
Qty: 60 TABLET | Refills: 1 | Status: SHIPPED | OUTPATIENT
Start: 2018-08-13 | End: 2019-02-05 | Stop reason: SINTOL

## 2018-08-13 NOTE — TELEPHONE ENCOUNTER
Pt called requesting a refill on the following medication which has been pended for you:    Requested Prescriptions     Pending Prescriptions Disp Refills    traZODone (DESYREL) 50 MG tablet 60 tablet 1     Sig: Take 2 tablets by mouth nightly     Last appointment: 05/07/2018  Next appointment: none found

## 2018-09-19 RX ORDER — RANITIDINE 150 MG/1
TABLET ORAL
Qty: 180 TABLET | Refills: 0 | Status: SHIPPED | OUTPATIENT
Start: 2018-09-19 | End: 2018-11-02 | Stop reason: ALTCHOICE

## 2018-09-25 RX ORDER — SIMVASTATIN 20 MG
TABLET ORAL
Qty: 90 TABLET | Refills: 3 | Status: SHIPPED | OUTPATIENT
Start: 2018-09-25 | End: 2019-09-25 | Stop reason: SDUPTHER

## 2018-09-25 NOTE — TELEPHONE ENCOUNTER
Penny Lynch called requesting a refill of the below medication which has been pended for you:     Requested Prescriptions     Pending Prescriptions Disp Refills    simvastatin (ZOCOR) 20 MG tablet [Pharmacy Med Name: SIMVASTATIN 20MG TABLETS] 90 tablet 3     Sig: TAKE 1 TABLET BY MOUTH DAILY       Last Appointment Date: 5/7/2018  Next Appointment Date: Visit date not found    No Known Allergies

## 2018-10-05 RX ORDER — ENALAPRIL MALEATE AND HYDROCHLOROTHIAZIDE 10; 25 MG/1; MG/1
TABLET ORAL
Qty: 60 TABLET | Refills: 5 | Status: SHIPPED | OUTPATIENT
Start: 2018-10-05 | End: 2019-04-22 | Stop reason: SDUPTHER

## 2018-11-02 ENCOUNTER — OFFICE VISIT (OUTPATIENT)
Dept: INTERNAL MEDICINE | Age: 78
End: 2018-11-02
Payer: MEDICARE

## 2018-11-02 VITALS
BODY MASS INDEX: 37.15 KG/M2 | RESPIRATION RATE: 16 BRPM | HEART RATE: 93 BPM | HEIGHT: 65 IN | WEIGHT: 223 LBS | OXYGEN SATURATION: 93 % | DIASTOLIC BLOOD PRESSURE: 80 MMHG | SYSTOLIC BLOOD PRESSURE: 102 MMHG

## 2018-11-02 DIAGNOSIS — Z00.00 HEALTHCARE MAINTENANCE: ICD-10-CM

## 2018-11-02 DIAGNOSIS — Z00.00 HEALTH CARE MAINTENANCE: ICD-10-CM

## 2018-11-02 DIAGNOSIS — M89.9 DISORDER OF BONE: ICD-10-CM

## 2018-11-02 DIAGNOSIS — Z23 NEED FOR INFLUENZA VACCINATION: Primary | ICD-10-CM

## 2018-11-02 DIAGNOSIS — K21.9 GASTROESOPHAGEAL REFLUX DISEASE WITHOUT ESOPHAGITIS: ICD-10-CM

## 2018-11-02 DIAGNOSIS — E55.9 VITAMIN D DEFICIENCY: ICD-10-CM

## 2018-11-02 LAB
ALBUMIN SERPL-MCNC: 4.3 G/DL (ref 3.5–5.2)
ALP BLD-CCNC: 65 U/L (ref 35–104)
ALT SERPL-CCNC: 24 U/L (ref 5–33)
ANION GAP SERPL CALCULATED.3IONS-SCNC: 15 MMOL/L (ref 7–19)
AST SERPL-CCNC: 21 U/L (ref 5–32)
BILIRUB SERPL-MCNC: 0.4 MG/DL (ref 0.2–1.2)
BUN BLDV-MCNC: 15 MG/DL (ref 8–23)
CALCIUM SERPL-MCNC: 10 MG/DL (ref 8.8–10.2)
CHLORIDE BLD-SCNC: 93 MMOL/L (ref 98–111)
CHOLESTEROL, TOTAL: 194 MG/DL (ref 160–199)
CO2: 32 MMOL/L (ref 22–29)
CREAT SERPL-MCNC: 0.7 MG/DL (ref 0.5–0.9)
GFR NON-AFRICAN AMERICAN: >60
GLUCOSE BLD-MCNC: 119 MG/DL (ref 74–109)
HCT VFR BLD CALC: 42.9 % (ref 37–47)
HDLC SERPL-MCNC: 63 MG/DL (ref 65–121)
HEMOGLOBIN: 14.1 G/DL (ref 12–16)
LDL CHOLESTEROL CALCULATED: 102 MG/DL
MCH RBC QN AUTO: 29 PG (ref 27–31)
MCHC RBC AUTO-ENTMCNC: 32.9 G/DL (ref 33–37)
MCV RBC AUTO: 88.1 FL (ref 81–99)
PDW BLD-RTO: 13.6 % (ref 11.5–14.5)
PLATELET # BLD: 327 K/UL (ref 130–400)
PMV BLD AUTO: 9.7 FL (ref 9.4–12.3)
POTASSIUM SERPL-SCNC: 4 MMOL/L (ref 3.5–5)
RBC # BLD: 4.87 M/UL (ref 4.2–5.4)
SODIUM BLD-SCNC: 140 MMOL/L (ref 136–145)
TOTAL PROTEIN: 7.4 G/DL (ref 6.6–8.7)
TRIGL SERPL-MCNC: 147 MG/DL (ref 0–149)
TSH SERPL DL<=0.05 MIU/L-ACNC: 2.87 UIU/ML (ref 0.27–4.2)
VITAMIN D 25-HYDROXY: 36 NG/ML
WBC # BLD: 10.3 K/UL (ref 4.8–10.8)

## 2018-11-02 PROCEDURE — 1036F TOBACCO NON-USER: CPT | Performed by: NURSE PRACTITIONER

## 2018-11-02 PROCEDURE — 1090F PRES/ABSN URINE INCON ASSESS: CPT | Performed by: NURSE PRACTITIONER

## 2018-11-02 PROCEDURE — 1101F PT FALLS ASSESS-DOCD LE1/YR: CPT | Performed by: NURSE PRACTITIONER

## 2018-11-02 PROCEDURE — 90662 IIV NO PRSV INCREASED AG IM: CPT | Performed by: NURSE PRACTITIONER

## 2018-11-02 PROCEDURE — 4040F PNEUMOC VAC/ADMIN/RCVD: CPT | Performed by: NURSE PRACTITIONER

## 2018-11-02 PROCEDURE — 99214 OFFICE O/P EST MOD 30 MIN: CPT | Performed by: NURSE PRACTITIONER

## 2018-11-02 PROCEDURE — G8427 DOCREV CUR MEDS BY ELIG CLIN: HCPCS | Performed by: NURSE PRACTITIONER

## 2018-11-02 PROCEDURE — 1123F ACP DISCUSS/DSCN MKR DOCD: CPT | Performed by: NURSE PRACTITIONER

## 2018-11-02 PROCEDURE — G8482 FLU IMMUNIZE ORDER/ADMIN: HCPCS | Performed by: NURSE PRACTITIONER

## 2018-11-02 PROCEDURE — G0008 ADMIN INFLUENZA VIRUS VAC: HCPCS | Performed by: NURSE PRACTITIONER

## 2018-11-02 PROCEDURE — G8399 PT W/DXA RESULTS DOCUMENT: HCPCS | Performed by: NURSE PRACTITIONER

## 2018-11-02 PROCEDURE — G8417 CALC BMI ABV UP PARAM F/U: HCPCS | Performed by: NURSE PRACTITIONER

## 2018-11-02 RX ORDER — QUETIAPINE FUMARATE 50 MG/1
50 TABLET, FILM COATED ORAL DAILY
Qty: 30 TABLET | Refills: 2 | Status: SHIPPED | OUTPATIENT
Start: 2018-11-02 | End: 2019-01-29 | Stop reason: SDUPTHER

## 2018-11-02 ASSESSMENT — ENCOUNTER SYMPTOMS
ABDOMINAL PAIN: 0
BLOOD IN STOOL: 0
NAUSEA: 0
COUGH: 0
SORE THROAT: 0
SHORTNESS OF BREATH: 0
EYE ITCHING: 0
STRIDOR: 0
CHOKING: 0
EYE DISCHARGE: 0
WHEEZING: 0
DIARRHEA: 0
COLOR CHANGE: 0
ABDOMINAL DISTENTION: 0
TROUBLE SWALLOWING: 0
VOMITING: 0
CONSTIPATION: 0

## 2018-11-02 NOTE — PROGRESS NOTES
texts.  The electronic translation of spoken language may be erroneous, or at times,nonsensical words or phrases may be inadvertently transcribed.   Although I have reviewed the note for such errors, some may still exist.

## 2018-12-26 RX ORDER — RANITIDINE 150 MG/1
TABLET ORAL
Qty: 180 TABLET | Refills: 0 | Status: SHIPPED | OUTPATIENT
Start: 2018-12-26 | End: 2019-04-07 | Stop reason: SDUPTHER

## 2019-01-21 ENCOUNTER — TELEPHONE (OUTPATIENT)
Dept: INTERNAL MEDICINE | Age: 79
End: 2019-01-21

## 2019-01-29 ENCOUNTER — TELEPHONE (OUTPATIENT)
Dept: INTERNAL MEDICINE | Age: 79
End: 2019-01-29

## 2019-01-29 RX ORDER — QUETIAPINE FUMARATE 50 MG/1
50 TABLET, FILM COATED ORAL DAILY
Qty: 30 TABLET | Refills: 1 | Status: SHIPPED | OUTPATIENT
Start: 2019-01-29 | End: 2019-02-05 | Stop reason: SINTOL

## 2019-02-05 ENCOUNTER — OFFICE VISIT (OUTPATIENT)
Dept: INTERNAL MEDICINE | Age: 79
End: 2019-02-05
Payer: MEDICARE

## 2019-02-05 VITALS
DIASTOLIC BLOOD PRESSURE: 80 MMHG | BODY MASS INDEX: 38.32 KG/M2 | RESPIRATION RATE: 16 BRPM | HEART RATE: 100 BPM | SYSTOLIC BLOOD PRESSURE: 114 MMHG | HEIGHT: 65 IN | WEIGHT: 230 LBS | OXYGEN SATURATION: 96 %

## 2019-02-05 DIAGNOSIS — M89.9 DISORDER OF BONE: ICD-10-CM

## 2019-02-05 DIAGNOSIS — E78.2 MIXED HYPERLIPIDEMIA: ICD-10-CM

## 2019-02-05 DIAGNOSIS — I10 ESSENTIAL HYPERTENSION: ICD-10-CM

## 2019-02-05 DIAGNOSIS — Z00.00 HEALTHCARE MAINTENANCE: ICD-10-CM

## 2019-02-05 DIAGNOSIS — K21.9 GASTROESOPHAGEAL REFLUX DISEASE WITHOUT ESOPHAGITIS: ICD-10-CM

## 2019-02-05 DIAGNOSIS — G47.00 INSOMNIA, UNSPECIFIED TYPE: ICD-10-CM

## 2019-02-05 DIAGNOSIS — M21.371 RIGHT FOOT DROP: ICD-10-CM

## 2019-02-05 DIAGNOSIS — Z00.00 HEALTH CARE MAINTENANCE: Primary | ICD-10-CM

## 2019-02-05 LAB
ALBUMIN SERPL-MCNC: 4.1 G/DL (ref 3.5–5.2)
ALP BLD-CCNC: 60 U/L (ref 35–104)
ALT SERPL-CCNC: 21 U/L (ref 5–33)
ANION GAP SERPL CALCULATED.3IONS-SCNC: 19 MMOL/L (ref 7–19)
AST SERPL-CCNC: 19 U/L (ref 5–32)
BILIRUB SERPL-MCNC: 0.3 MG/DL (ref 0.2–1.2)
BUN BLDV-MCNC: 16 MG/DL (ref 8–23)
CALCIUM SERPL-MCNC: 9.4 MG/DL (ref 8.8–10.2)
CHLORIDE BLD-SCNC: 98 MMOL/L (ref 98–111)
CO2: 27 MMOL/L (ref 22–29)
CREAT SERPL-MCNC: 0.7 MG/DL (ref 0.5–0.9)
GFR NON-AFRICAN AMERICAN: >60
GLUCOSE BLD-MCNC: 108 MG/DL (ref 74–109)
HBA1C MFR BLD: 5.8 % (ref 4–6)
HCT VFR BLD CALC: 42.1 % (ref 37–47)
HEMOGLOBIN: 13.4 G/DL (ref 12–16)
MCH RBC QN AUTO: 28.5 PG (ref 27–31)
MCHC RBC AUTO-ENTMCNC: 31.8 G/DL (ref 33–37)
MCV RBC AUTO: 89.6 FL (ref 81–99)
PDW BLD-RTO: 13.4 % (ref 11.5–14.5)
PLATELET # BLD: 306 K/UL (ref 130–400)
PMV BLD AUTO: 9.7 FL (ref 9.4–12.3)
POTASSIUM SERPL-SCNC: 3.8 MMOL/L (ref 3.5–5)
RBC # BLD: 4.7 M/UL (ref 4.2–5.4)
SODIUM BLD-SCNC: 144 MMOL/L (ref 136–145)
TOTAL PROTEIN: 7.2 G/DL (ref 6.6–8.7)
TSH SERPL DL<=0.05 MIU/L-ACNC: 2.32 UIU/ML (ref 0.27–4.2)
VITAMIN D 25-HYDROXY: 30.9 NG/ML
WBC # BLD: 7.9 K/UL (ref 4.8–10.8)

## 2019-02-05 PROCEDURE — G8417 CALC BMI ABV UP PARAM F/U: HCPCS | Performed by: NURSE PRACTITIONER

## 2019-02-05 PROCEDURE — 1036F TOBACCO NON-USER: CPT | Performed by: NURSE PRACTITIONER

## 2019-02-05 PROCEDURE — G8427 DOCREV CUR MEDS BY ELIG CLIN: HCPCS | Performed by: NURSE PRACTITIONER

## 2019-02-05 PROCEDURE — G8482 FLU IMMUNIZE ORDER/ADMIN: HCPCS | Performed by: NURSE PRACTITIONER

## 2019-02-05 PROCEDURE — 1123F ACP DISCUSS/DSCN MKR DOCD: CPT | Performed by: NURSE PRACTITIONER

## 2019-02-05 PROCEDURE — G8399 PT W/DXA RESULTS DOCUMENT: HCPCS | Performed by: NURSE PRACTITIONER

## 2019-02-05 PROCEDURE — 1090F PRES/ABSN URINE INCON ASSESS: CPT | Performed by: NURSE PRACTITIONER

## 2019-02-05 PROCEDURE — 99214 OFFICE O/P EST MOD 30 MIN: CPT | Performed by: NURSE PRACTITIONER

## 2019-02-05 PROCEDURE — 4040F PNEUMOC VAC/ADMIN/RCVD: CPT | Performed by: NURSE PRACTITIONER

## 2019-02-05 PROCEDURE — 1101F PT FALLS ASSESS-DOCD LE1/YR: CPT | Performed by: NURSE PRACTITIONER

## 2019-02-05 RX ORDER — AMITRIPTYLINE HYDROCHLORIDE 50 MG/1
50 TABLET, FILM COATED ORAL NIGHTLY
Qty: 90 TABLET | Refills: 1 | Status: SHIPPED | OUTPATIENT
Start: 2019-02-05 | End: 2019-08-06

## 2019-02-05 ASSESSMENT — ENCOUNTER SYMPTOMS
EYE DISCHARGE: 0
WHEEZING: 0
DIARRHEA: 0
BLOOD IN STOOL: 0
SORE THROAT: 0
TROUBLE SWALLOWING: 0
SHORTNESS OF BREATH: 0
ABDOMINAL PAIN: 0
ABDOMINAL DISTENTION: 0
VOMITING: 0
EYE ITCHING: 0
CHOKING: 0
COUGH: 0
STRIDOR: 0
CONSTIPATION: 0
NAUSEA: 0
COLOR CHANGE: 0

## 2019-02-05 ASSESSMENT — PATIENT HEALTH QUESTIONNAIRE - PHQ9
SUM OF ALL RESPONSES TO PHQ QUESTIONS 1-9: 0
1. LITTLE INTEREST OR PLEASURE IN DOING THINGS: 0
2. FEELING DOWN, DEPRESSED OR HOPELESS: 0
SUM OF ALL RESPONSES TO PHQ QUESTIONS 1-9: 0
SUM OF ALL RESPONSES TO PHQ9 QUESTIONS 1 & 2: 0

## 2019-04-08 RX ORDER — RANITIDINE 150 MG/1
TABLET ORAL
Qty: 180 TABLET | Refills: 0 | Status: SHIPPED | OUTPATIENT
Start: 2019-04-08 | End: 2019-07-05 | Stop reason: SDUPTHER

## 2019-04-22 RX ORDER — ENALAPRIL MALEATE AND HYDROCHLOROTHIAZIDE 10; 25 MG/1; MG/1
TABLET ORAL
Qty: 60 TABLET | Refills: 2 | Status: SHIPPED | OUTPATIENT
Start: 2019-04-22 | End: 2019-05-22 | Stop reason: SDUPTHER

## 2019-04-22 NOTE — TELEPHONE ENCOUNTER
Melanie Solo called requesting a refill of the below medication which has been pended for you:     Requested Prescriptions     Pending Prescriptions Disp Refills    enalapril-hydrochlorothiazide (VASERETIC) 10-25 MG per tablet [Pharmacy Med Name: ENALAPRIL-HCTZ 10-25MG TABLETS] 60 tablet 0     Sig: TAKE 1 TABLET BY MOUTH TWICE DAILY       Last Appointment Date: 2/5/2019  Next Appointment Date: Visit date not found    No Known Allergies

## 2019-05-22 RX ORDER — ENALAPRIL MALEATE AND HYDROCHLOROTHIAZIDE 10; 25 MG/1; MG/1
1 TABLET ORAL 2 TIMES DAILY
Qty: 180 TABLET | Refills: 1 | Status: SHIPPED | OUTPATIENT
Start: 2019-05-22 | End: 2019-11-18 | Stop reason: SDUPTHER

## 2019-05-22 NOTE — TELEPHONE ENCOUNTER
Requested Prescriptions     Pending Prescriptions Disp Refills    enalapril-hydrochlorothiazide (VASERETIC) 10-25 MG per tablet 180 tablet 1     Sig: Take 1 tablet by mouth 2 times daily

## 2019-07-05 RX ORDER — RANITIDINE 150 MG/1
TABLET ORAL
Qty: 180 TABLET | Refills: 0 | Status: SHIPPED | OUTPATIENT
Start: 2019-07-05 | End: 2019-10-01 | Stop reason: SDUPTHER

## 2019-08-06 ENCOUNTER — OFFICE VISIT (OUTPATIENT)
Dept: INTERNAL MEDICINE | Age: 79
End: 2019-08-06
Payer: MEDICARE

## 2019-08-06 VITALS
WEIGHT: 230 LBS | HEIGHT: 65 IN | HEART RATE: 100 BPM | DIASTOLIC BLOOD PRESSURE: 86 MMHG | SYSTOLIC BLOOD PRESSURE: 128 MMHG | BODY MASS INDEX: 38.32 KG/M2 | OXYGEN SATURATION: 93 % | RESPIRATION RATE: 18 BRPM

## 2019-08-06 DIAGNOSIS — Z12.39 SCREENING FOR BREAST CANCER: Primary | ICD-10-CM

## 2019-08-06 DIAGNOSIS — K21.9 GASTROESOPHAGEAL REFLUX DISEASE WITHOUT ESOPHAGITIS: ICD-10-CM

## 2019-08-06 DIAGNOSIS — F51.01 PRIMARY INSOMNIA: ICD-10-CM

## 2019-08-06 DIAGNOSIS — Z00.00 HEALTHCARE MAINTENANCE: ICD-10-CM

## 2019-08-06 DIAGNOSIS — I10 ESSENTIAL HYPERTENSION: ICD-10-CM

## 2019-08-06 DIAGNOSIS — E78.2 MIXED HYPERLIPIDEMIA: ICD-10-CM

## 2019-08-06 DIAGNOSIS — M21.371 RIGHT FOOT DROP: ICD-10-CM

## 2019-08-06 DIAGNOSIS — M89.9 DISORDER OF BONE: ICD-10-CM

## 2019-08-06 DIAGNOSIS — H90.6 MIXED CONDUCTIVE AND SENSORINEURAL HEARING LOSS OF BOTH EARS: ICD-10-CM

## 2019-08-06 DIAGNOSIS — Z00.00 ROUTINE GENERAL MEDICAL EXAMINATION AT A HEALTH CARE FACILITY: ICD-10-CM

## 2019-08-06 PROCEDURE — 99214 OFFICE O/P EST MOD 30 MIN: CPT | Performed by: NURSE PRACTITIONER

## 2019-08-06 PROCEDURE — 4040F PNEUMOC VAC/ADMIN/RCVD: CPT | Performed by: NURSE PRACTITIONER

## 2019-08-06 PROCEDURE — G0439 PPPS, SUBSEQ VISIT: HCPCS | Performed by: NURSE PRACTITIONER

## 2019-08-06 PROCEDURE — G8417 CALC BMI ABV UP PARAM F/U: HCPCS | Performed by: NURSE PRACTITIONER

## 2019-08-06 PROCEDURE — 1123F ACP DISCUSS/DSCN MKR DOCD: CPT | Performed by: NURSE PRACTITIONER

## 2019-08-06 PROCEDURE — 1090F PRES/ABSN URINE INCON ASSESS: CPT | Performed by: NURSE PRACTITIONER

## 2019-08-06 PROCEDURE — 1036F TOBACCO NON-USER: CPT | Performed by: NURSE PRACTITIONER

## 2019-08-06 PROCEDURE — G8427 DOCREV CUR MEDS BY ELIG CLIN: HCPCS | Performed by: NURSE PRACTITIONER

## 2019-08-06 PROCEDURE — G8399 PT W/DXA RESULTS DOCUMENT: HCPCS | Performed by: NURSE PRACTITIONER

## 2019-08-06 RX ORDER — CEPHALEXIN 500 MG/1
500 CAPSULE ORAL 3 TIMES DAILY
Qty: 21 CAPSULE | Refills: 0 | Status: SHIPPED | OUTPATIENT
Start: 2019-08-06 | End: 2019-08-13

## 2019-08-06 RX ORDER — TRAZODONE HYDROCHLORIDE 100 MG/1
100 TABLET ORAL NIGHTLY
Qty: 90 TABLET | Refills: 1 | Status: SHIPPED | OUTPATIENT
Start: 2019-08-06 | End: 2020-02-06

## 2019-08-06 ASSESSMENT — ENCOUNTER SYMPTOMS
EYE DISCHARGE: 0
WHEEZING: 0
VOMITING: 0
ABDOMINAL PAIN: 0
TROUBLE SWALLOWING: 0
SORE THROAT: 0
STRIDOR: 0
DIARRHEA: 0
BLOOD IN STOOL: 0
NAUSEA: 0
ABDOMINAL DISTENTION: 0
SHORTNESS OF BREATH: 0
COUGH: 0
EYE ITCHING: 0
CHOKING: 0
CONSTIPATION: 0
COLOR CHANGE: 0

## 2019-08-06 ASSESSMENT — LIFESTYLE VARIABLES: HOW OFTEN DO YOU HAVE A DRINK CONTAINING ALCOHOL: 0

## 2019-08-06 ASSESSMENT — PATIENT HEALTH QUESTIONNAIRE - PHQ9
SUM OF ALL RESPONSES TO PHQ QUESTIONS 1-9: 0
SUM OF ALL RESPONSES TO PHQ QUESTIONS 1-9: 0

## 2019-08-06 NOTE — PATIENT INSTRUCTIONS
1.  Difficulty hearing she is strong suggested to see an audiologist  2. Insomnia we will try trazodone 100 mg at bedtime  3. Hypertension stable no changes  4. Hyperlipidemia stable no changes  5. GERD she is stable on Zantac and Gaviscon no changes are necessary  Personalized Preventive Plan for Emmy Overton - 8/6/2019  Medicare offers a range of preventive health benefits. Some of the tests and screenings are paid in full while other may be subject to a deductible, co-insurance, and/or copay. Some of these benefits include a comprehensive review of your medical history including lifestyle, illnesses that may run in your family, and various assessments and screenings as appropriate. After reviewing your medical record and screening and assessments performed today your provider may have ordered immunizations, labs, imaging, and/or referrals for you. A list of these orders (if applicable) as well as your Preventive Care list are included within your After Visit Summary for your review. Other Preventive Recommendations:    · A preventive eye exam performed by an eye specialist is recommended every 1-2 years to screen for glaucoma; cataracts, macular degeneration, and other eye disorders. · A preventive dental visit is recommended every 6 months. · Try to get at least 150 minutes of exercise per week or 10,000 steps per day on a pedometer . · Order or download the FREE \"Exercise & Physical Activity: Your Everyday Guide\" from [employer]. Call 3-789.421.1961 or search [employer] online. · You need 0629-3005 mg of calcium and 7192-9792 IU of vitamin D per day. It is possible to meet your calcium requirement with diet alone, but a vitamin D supplement is usually necessary to meet this goal.  · When exposed to the sun, use a sunscreen that protects against both UVA and UVB radiation with an SPF of 30 or greater.  Reapply every 2 to 3 hours or after sweating, drying off with a towel, or swimming. · Always wear a seat belt when traveling in a car. Always wear a helmet when riding a bicycle or motorcycle.

## 2019-08-06 NOTE — PROGRESS NOTES
GLUCOSE 108 02/05/2019    CALCIUM 9.4 02/05/2019    PROT 7.2 02/05/2019    LABALBU 4.1 02/05/2019    BILITOT 0.3 02/05/2019    ALKPHOS 60 02/05/2019    AST 19 02/05/2019    ALT 21 02/05/2019    LABGLOM >60 02/05/2019     Lab Results   Component Value Date    CHOL 194 11/02/2018     Lab Results   Component Value Date    TRIG 147 11/02/2018     Lab Results   Component Value Date    HDL 63 (L) 11/02/2018     Lab Results   Component Value Date    LDLCALC 102 11/02/2018     Lab Results   Component Value Date     02/05/2019    K 3.8 02/05/2019    CL 98 02/05/2019    CO2 27 02/05/2019    BUN 16 02/05/2019    CREATININE 0.7 02/05/2019    GLUCOSE 108 02/05/2019    CALCIUM 9.4 02/05/2019      Lab Results   Component Value Date    WBC 7.9 02/05/2019    HGB 13.4 02/05/2019    HCT 42.1 02/05/2019    MCV 89.6 02/05/2019     02/05/2019    RBC 4.70 02/05/2019    MCH 28.5 02/05/2019    MCHC 31.8 (L) 02/05/2019    RDW 13.4 02/05/2019     Lab Results   Component Value Date    VITD25 30.9 02/05/2019       Subjective:      Review of Systems   Constitutional: Negative for fatigue, fever and unexpected weight change. HENT: Positive for hearing loss. Negative for ear discharge, ear pain, mouth sores, sore throat and trouble swallowing. Eyes: Negative for discharge, itching and visual disturbance. Respiratory: Negative for cough, choking, shortness of breath, wheezing and stridor. Cardiovascular: Negative for chest pain, palpitations and leg swelling. Gastrointestinal: Negative for abdominal distention, abdominal pain, blood in stool, constipation, diarrhea, nausea and vomiting. Endocrine: Negative for cold intolerance, polydipsia and polyuria. Genitourinary: Negative for difficulty urinating, dysuria, frequency and urgency. Musculoskeletal: Negative for arthralgias and gait problem. Skin: Negative for color change and rash. Allergic/Immunologic: Negative for food allergies and immunocompromised state. Neurological: Negative for dizziness, tremors, syncope, speech difficulty, weakness and headaches. Right foot drop  She wears a brae   Hematological: Negative for adenopathy. Does not bruise/bleed easily. Psychiatric/Behavioral: Negative for confusion and hallucinations. Objective:     Physical Exam   Constitutional: She is oriented to person, place, and time. She appears well-developed and well-nourished. No distress. HENT:   Head: Normocephalic and atraumatic. Ears are clear; Tm clear;    Eyes: Pupils are equal, round, and reactive to light. Right eye exhibits no discharge. Left eye exhibits no discharge. No scleral icterus. Neck: Normal range of motion. Neck supple. No JVD present. No thyromegaly present. Cardiovascular: Normal rate, regular rhythm and normal heart sounds. No murmur heard. Pulmonary/Chest: Effort normal and breath sounds normal. No respiratory distress. She has no wheezes. She has no rales. Abdominal: Soft. Bowel sounds are normal. She exhibits no distension and no mass. There is no tenderness. There is no rebound and no guarding. Musculoskeletal: Normal range of motion. She exhibits no edema or tenderness. Neurological: She is alert and oriented to person, place, and time. She has normal reflexes. She displays normal reflexes. No cranial nerve deficit. Coordination normal.   Right foot drop    Skin: Skin is warm and dry. No rash noted. No erythema. Redness around the brace with some swelling    Psychiatric: Her behavior is normal. Judgment and thought content normal. She does not exhibit a depressed mood. /86   Pulse 100   Resp 18   Ht 5' 5\" (1.651 m)   Wt 230 lb (104.3 kg)   SpO2 93%   BMI 38.27 kg/m²     Assessment:       Diagnosis Orders   1. Screening for breast cancer  [facility] DIGITAL SCREEN W OR WO CAD BILATERAL   2.  Healthcare maintenance  CBC Auto Differential    Comprehensive Metabolic Panel    Lipid Panel    Vitamin D 25 Hydroxy

## 2019-09-25 RX ORDER — SIMVASTATIN 20 MG
TABLET ORAL
Qty: 90 TABLET | Refills: 1 | Status: SHIPPED | OUTPATIENT
Start: 2019-09-25 | End: 2020-03-23

## 2019-10-01 RX ORDER — RANITIDINE 150 MG/1
TABLET ORAL
Qty: 180 TABLET | Refills: 1 | Status: SHIPPED | OUTPATIENT
Start: 2019-10-01 | End: 2020-03-23

## 2019-10-17 DIAGNOSIS — Z12.31 SCREENING MAMMOGRAM FOR HIGH-RISK PATIENT: ICD-10-CM

## 2019-10-17 DIAGNOSIS — Z12.31 ENCOUNTER FOR SCREENING MAMMOGRAM FOR MALIGNANT NEOPLASM OF BREAST: Primary | ICD-10-CM

## 2019-10-31 ENCOUNTER — HOSPITAL ENCOUNTER (OUTPATIENT)
Dept: WOMENS IMAGING | Age: 79
Discharge: HOME | End: 2019-10-31
Payer: MEDICARE

## 2019-10-31 DIAGNOSIS — Z12.31 SCREENING MAMMOGRAM FOR HIGH-RISK PATIENT: ICD-10-CM

## 2019-10-31 DIAGNOSIS — Z12.31 ENCOUNTER FOR SCREENING MAMMOGRAM FOR MALIGNANT NEOPLASM OF BREAST: ICD-10-CM

## 2019-10-31 PROCEDURE — 77063 BREAST TOMOSYNTHESIS BI: CPT

## 2019-11-18 RX ORDER — ENALAPRIL MALEATE AND HYDROCHLOROTHIAZIDE 10; 25 MG/1; MG/1
TABLET ORAL
Qty: 180 TABLET | Refills: 1 | Status: SHIPPED | OUTPATIENT
Start: 2019-11-18 | End: 2020-05-11

## 2020-02-06 ENCOUNTER — OFFICE VISIT (OUTPATIENT)
Dept: INTERNAL MEDICINE | Age: 80
End: 2020-02-06
Payer: MEDICARE

## 2020-02-06 VITALS — SYSTOLIC BLOOD PRESSURE: 114 MMHG | DIASTOLIC BLOOD PRESSURE: 82 MMHG | OXYGEN SATURATION: 97 % | HEART RATE: 76 BPM

## 2020-02-06 PROBLEM — F41.9 ANXIETY: Status: ACTIVE | Noted: 2020-02-06

## 2020-02-06 PROBLEM — F32.A DEPRESSION: Status: ACTIVE | Noted: 2020-02-06

## 2020-02-06 PROBLEM — R32 URINARY INCONTINENCE: Status: ACTIVE | Noted: 2020-02-06

## 2020-02-06 PROBLEM — G89.21 CHRONIC PAIN DUE TO TRAUMA: Status: ACTIVE | Noted: 2020-02-06

## 2020-02-06 PROCEDURE — G8399 PT W/DXA RESULTS DOCUMENT: HCPCS | Performed by: NURSE PRACTITIONER

## 2020-02-06 PROCEDURE — G8417 CALC BMI ABV UP PARAM F/U: HCPCS | Performed by: NURSE PRACTITIONER

## 2020-02-06 PROCEDURE — 99402 PREV MED CNSL INDIV APPRX 30: CPT | Performed by: NURSE PRACTITIONER

## 2020-02-06 PROCEDURE — 1036F TOBACCO NON-USER: CPT | Performed by: NURSE PRACTITIONER

## 2020-02-06 PROCEDURE — 4040F PNEUMOC VAC/ADMIN/RCVD: CPT | Performed by: NURSE PRACTITIONER

## 2020-02-06 PROCEDURE — 90653 IIV ADJUVANT VACCINE IM: CPT | Performed by: NURSE PRACTITIONER

## 2020-02-06 PROCEDURE — G8482 FLU IMMUNIZE ORDER/ADMIN: HCPCS | Performed by: NURSE PRACTITIONER

## 2020-02-06 PROCEDURE — 1123F ACP DISCUSS/DSCN MKR DOCD: CPT | Performed by: NURSE PRACTITIONER

## 2020-02-06 PROCEDURE — G8427 DOCREV CUR MEDS BY ELIG CLIN: HCPCS | Performed by: NURSE PRACTITIONER

## 2020-02-06 PROCEDURE — 0509F URINE INCON PLAN DOCD: CPT | Performed by: NURSE PRACTITIONER

## 2020-02-06 PROCEDURE — 1090F PRES/ABSN URINE INCON ASSESS: CPT | Performed by: NURSE PRACTITIONER

## 2020-02-06 PROCEDURE — G0008 ADMIN INFLUENZA VIRUS VAC: HCPCS | Performed by: NURSE PRACTITIONER

## 2020-02-06 PROCEDURE — 99214 OFFICE O/P EST MOD 30 MIN: CPT | Performed by: NURSE PRACTITIONER

## 2020-02-06 RX ORDER — QUETIAPINE FUMARATE 25 MG/1
25 TABLET, FILM COATED ORAL 2 TIMES DAILY
Qty: 60 TABLET | Refills: 3 | Status: SHIPPED | OUTPATIENT
Start: 2020-02-06 | End: 2020-03-03 | Stop reason: SDUPTHER

## 2020-02-06 ASSESSMENT — ENCOUNTER SYMPTOMS
STRIDOR: 0
EYE ITCHING: 0
TROUBLE SWALLOWING: 0
COUGH: 0
COLOR CHANGE: 0
NAUSEA: 0
EYE DISCHARGE: 0
DIARRHEA: 0
ABDOMINAL DISTENTION: 0
CHOKING: 0
BLOOD IN STOOL: 0
CONSTIPATION: 0
VOMITING: 0
ABDOMINAL PAIN: 0
SORE THROAT: 0
WHEEZING: 0
SHORTNESS OF BREATH: 0

## 2020-02-06 ASSESSMENT — PATIENT HEALTH QUESTIONNAIRE - PHQ9
SUM OF ALL RESPONSES TO PHQ9 QUESTIONS 1 & 2: 0
SUM OF ALL RESPONSES TO PHQ QUESTIONS 1-9: 0
1. LITTLE INTEREST OR PLEASURE IN DOING THINGS: 0
2. FEELING DOWN, DEPRESSED OR HOPELESS: 0
SUM OF ALL RESPONSES TO PHQ QUESTIONS 1-9: 0

## 2020-02-06 NOTE — PROGRESS NOTES
[facility] INTERNAL MEDICINE  [address] [id number] [address]  Dept: [phone]  Dept Fax: [phone]  Loc: [phone]    Meeta Hurtado is a 67 y.o. female who presents today for her medical conditions/complaints as noted below. Meeta Hurtado is c/fouzia Hyperlipidemia (Patient is here for routine follow up visit. Patient has not had recent labs done for this visit.)        HPI:     HPI   she is here today with her  and her daughter she is very hard of hearing    1. Insomnia she has been trazodone but she says that she is taken that with melatonin and that is not helping  2. Health maintenance; She cant remember her last mammogram or PAP  Smear but she think she needs a Pap smear and talking further with the family they said they would just hold off on that for right now  3. Depression she has been on multiple meds in the past she has even had a suicide attempt years ago that resulted in a fall. She had terribly injured her right ankle and then it also caused fractures of her back as well. We had her on the trazodone hoping that would help but her  and daughter say that is not. She is also having some what seems to be sundowning he says that she stays up all night and sleeps in her chair during the daytime and then about 4 or 5:00 she goes to her room and start screaming and yelling and throwing things he said that is been bad for the last 6 months worsened over the last month for the last 2 days has been really bad the daughters had to come stay with them. She declines of desire to hurt herself or others. Her  is not frightened he does not feel she is trying to harming herself or anyone else. 4.  Obesity; this is remained stable she really is not in any physical condition or mental condition to try to change anything as far as her obesity right now  5. Hyperlipidemia-  Stable on current meds; Takes as directed;   No side effects of the meds;  6. Old ankle injury; She had triple arthrodesis after a fall and since then she has developed foot drop of the right ankle; She has AFO that is over 21years old and she has rigged and rigged it to the point she is using duct tape to  Keep the brace in place; She pretty much wears it 24/7 as she will not use her walker; She has pressure area on dorsum of the right foot; and the medial aspect of the right ankle as well   7. Incontinence  She is having issues to the point of changing her meds   8. Anxiety and confusion  With memory loss; She has long history of this but no     Over 30 minutes spent on counseling this is regarding home safety medicine regulation home needs as far as a new AFO brace potty chair. Also discussed referral to psych to help regulate her medications. They are in agreement with this  Chief Complaint   Patient presents with    Hyperlipidemia     Patient is here for routine follow up visit. Patient has not had recent labs done for this visit.        Past Medical History:   Diagnosis Date    Biliary dyskinesia     Esophageal reflux     Hypertension     Idiopathic peripheral neuropathy     right foot drop, atrophy    IFG (impaired fasting glucose)     Insomnia     Major depressive disorder     history of suicide attempt, years ago     Mixed hyperlipidemia     Obesity       Past Surgical History:   Procedure Laterality Date    CHOLECYSTECTOMY      COLONOSCOPY  08/24/2017    Dr Chuy Weathers repeat 5 years, diverticulosis       Vitals 2/6/2020 8/6/2019 2/5/2019 11/2/2018 5/7/2018 5/32/0920   SYSTOLIC [phone] 042 011   DIASTOLIC 82 86 80 80 72 80   Pulse 76 100 100 93 102 94   Temp - - - - - 96.4   Resp - 18 16 16 16 -   SpO2 97 93 96 93 96 95   Weight - 230 lb 230 lb 223 lb 227 lb (No Data)   Height - 5' 5\" 5' 5\" 5' 5\" 5' 5\" 5' 5\"   BMI (wt*703/ht~2) - 38.27 kg/m2 38.27 kg/m2 37.11 kg/m2 37.77 kg/m2 -       Family History   Problem Relation Age of Onset    COPD Mother     Diabetes Father        Social History     Tobacco Use    Smoking status: Never Smoker    Smokeless tobacco: Never Used   Substance Use Topics    Alcohol use: No      Current Outpatient Medications   Medication Sig Dispense Refill    mirabegron (MYRBETRIQ) 25 MG TB24 Take 1 tablet by mouth daily 30 tablet 1    QUEtiapine (SEROQUEL) 25 MG tablet Take 1 tablet by mouth 2 times daily 60 tablet 3    enalapril-hydrochlorothiazide (VASERETIC) 10-25 MG per tablet TAKE 1 TABLET BY MOUTH TWICE DAILY 180 tablet 1    ranitidine (ZANTAC) 150 MG tablet TAKE 1 TABLET BY MOUTH TWICE DAILY 180 tablet 1    simvastatin (ZOCOR) 20 MG tablet TAKE 1 TABLET BY MOUTH DAILY 90 tablet 1    calcium carbonate-vitamin D (CALCIUM 600 + D) 600-400 MG-UNIT TABS per tab Take 1 tablet by mouth 2 times daily       No current facility-administered medications for this visit.       No Known Allergies    Health Maintenance   Topic Date Due    Lipid screen  11/02/2019    Potassium monitoring  02/05/2020    Creatinine monitoring  02/05/2020    DTaP/Tdap/Td vaccine (1 - Tdap) 02/27/2019 (Originally 9/5/1951)    Shingles Vaccine (1 of 2) 08/06/2020 (Originally 9/5/1990)    Annual Wellness Visit (AWV)  08/06/2020    Colon cancer screen colonoscopy  08/24/2022    DEXA (modify frequency per FRAX score)  Completed    Flu vaccine  Completed    Pneumococcal 65+ years Vaccine  Completed    Hepatitis A vaccine  Aged Out    Hepatitis B vaccine  Aged Out    Hib vaccine  Aged Out    Meningococcal (ACWY) vaccine  Aged Out       Lab Results   Component Value Date    LABA1C 5.8 02/05/2019     No results found for: PSA, PSADIA  TSH   Date Value Ref Range Status   02/05/2019 2.320 0.270 - 4.200 uIU/mL Final   ]  Lab Results   Component Value Date     02/05/2019    K 3.8 02/05/2019    CL 98 02/05/2019    CO2 27 02/05/2019    BUN 16 02/05/2019    CREATININE 0.7 02/05/2019    GLUCOSE 108 02/05/2019    CALCIUM 9.4 02/05/2019    PROT 7.2 02/05/2019    LABALBU 4.1 02/05/2019    BILITOT 0.3 02/05/2019    ALKPHOS 60 02/05/2019    AST 19 02/05/2019    ALT 21 02/05/2019    LABGLOM >60 02/05/2019     Lab Results   Component Value Date    CHOL 194 11/02/2018     Lab Results   Component Value Date    TRIG 147 11/02/2018     Lab Results   Component Value Date    HDL 63 (L) 11/02/2018     Lab Results   Component Value Date    LDLCALC 102 11/02/2018     Lab Results   Component Value Date     02/05/2019    K 3.8 02/05/2019    CL 98 02/05/2019    CO2 27 02/05/2019    BUN 16 02/05/2019    CREATININE 0.7 02/05/2019    GLUCOSE 108 02/05/2019    CALCIUM 9.4 02/05/2019      Lab Results   Component Value Date    WBC 7.9 02/05/2019    HGB 13.4 02/05/2019    HCT 42.1 02/05/2019    MCV 89.6 02/05/2019     02/05/2019    RBC 4.70 02/05/2019    MCH 28.5 02/05/2019    MCHC 31.8 (L) 02/05/2019    RDW 13.4 02/05/2019     Lab Results   Component Value Date    VITD25 30.9 02/05/2019       Subjective:      Review of Systems   Constitutional: Positive for fever. Negative for fatigue and unexpected weight change. HENT: Negative for ear discharge, ear pain, mouth sores, sore throat and trouble swallowing. Eyes: Negative for discharge, itching and visual disturbance. Respiratory: Negative for cough, choking, shortness of breath, wheezing and stridor. Cardiovascular: Negative for chest pain, palpitations and leg swelling. Gastrointestinal: Negative for abdominal distention, abdominal pain, blood in stool, constipation, diarrhea, nausea and vomiting. Endocrine: Negative for cold intolerance, polydipsia and polyuria. Genitourinary: Negative for difficulty urinating, dysuria, frequency and urgency. Musculoskeletal: Positive for arthralgias. Negative for gait problem. Skin: Negative for color change and rash. Allergic/Immunologic: Negative for food allergies and immunocompromised state.    Neurological: Negative for dizziness, deficit. Coordination: Coordination normal.      Gait: Gait abnormal.      Deep Tendon Reflexes: Reflexes are normal and symmetric. Reflexes normal.      Comments: She is very hard of hearing     Psychiatric:         Mood and Affect: Mood is not depressed. Behavior: Behavior normal.         Thought Content: Thought content normal.         Judgment: Judgment normal.       /82   Pulse 76   SpO2 97%     Assessment:       Diagnosis Orders   1. Primary insomnia     2. Need for influenza vaccination  INFLUENZA, TRIV, INACTIVATED, SUBUNIT, ADJUVANTED, 65 YRS AND OLDER, IM, PREFILL SYR, 0.5ML (FLUAD TRIV)   3. Essential hypertension  CBC Auto Differential    Comprehensive Metabolic Panel    Urinalysis Reflex to Culture    TSH without Reflex   4. Idiopathic peripheral neuropathy     5. Mixed conductive and sensorineural hearing loss of both ears     6. Mixed hyperlipidemia  Lipid Panel   7. Right foot drop     8. Vitamin D deficiency  Vitamin D 25 Hydroxy   9. [address] [facility] [place], [facility], [place] ([address] [place] [address])   10. SunDown syndrome  [address] [facility], [address] [place] [address], Behavioral Medicine, [place] ([address] [place] [address])   11. Depression, unspecified depression type     12. Obesity, unspecified classification, unspecified obesity type, unspecified whether serious comorbidity present     13. Chronic pain due to trauma     14. Urinary incontinence, unspecified type       Labs reviewedfrom they did not get her labs done her  is going to bring her in the next few days to get that done    Plan:        Patient given educational materials - see patient instructions. Discussed use, benefit, and side effects of prescribed medications. Allpatient questions answered. Pt voiced understanding. Reviewed health maintenance. Instructed to continue current medications, diet and exercise. Patient agreed with treatment plan. Follow up as directed.    MEDICATIONS:  Orders Placed This Encounter Medications    mirabegron (MYRBETRIQ) 25 MG TB24     Sig: Take 1 tablet by mouth daily     Dispense:  30 tablet     Refill:  1    QUEtiapine (SEROQUEL) 25 MG tablet     Sig: Take 1 tablet by mouth 2 times daily     Dispense:  60 tablet     Refill:  3         ORDERS:  Orders Placed This Encounter   Procedures    INFLUENZA, TRIV, INACTIVATED, SUBUNIT, ADJUVANTED, 65 YRS AND OLDER, IM, PREFILL SYR, 0.5ML (FLUAD TRIV)    CBC Auto Differential    Comprehensive Metabolic Panel    Lipid Panel    Vitamin D 25 Hydroxy    Urinalysis Reflex to Culture    TSH without Reflex    [facility], Behavioral Medicine, [place] ([facility])       Follow-up:  Return in about 2 years (around 2/6/2022). PATIENT INSTRUCTIONS:  Patient Instructions   1. Insomnia we will stop the trazodone and the melatonin  2. Health maintenance we can have her come back another day we had a very long visit that was supposed to be 15 minutes  3. Depression we will try Seroquel hopefully that will help that as well as the sundowners will start 20 5 in the morning 20 5 in the afternoon we may have to increase that short order  4. Obesity  We are not been addressed that at this time to try to make any changes  5. Hyperlipidemia continue same meds for now  6. Old ankle injury we are going to write a prescription for a new AFO. Her daughter who is a physical therapist is going to take this to 1 of the supply houses and get her a new brace and hopefully she will use it. The problem that she says is been in the past that they will get new ones but then she will just always go back to the old 1 that she is actually using duct tape on right now.     7.  We need to check her urine first and then we can do some Myrbetriq if we need to but I just want to start too many new medicines at one time  #8 anxiety and confusion hoping starting the Seroquel will help and again we may have to change the dose pretty quickly but I think her

## 2020-03-03 ENCOUNTER — TELEPHONE (OUTPATIENT)
Dept: INTERNAL MEDICINE | Age: 80
End: 2020-03-03

## 2020-03-03 RX ORDER — QUETIAPINE FUMARATE 50 MG/1
50 TABLET, FILM COATED ORAL 2 TIMES DAILY
Qty: 30 TABLET | Refills: 1 | Status: SHIPPED | OUTPATIENT
Start: 2020-03-03 | End: 2020-04-21

## 2020-03-03 NOTE — TELEPHONE ENCOUNTER
Patient daughter is wanting to know if you can increase Quetiapine. She is currently taking 25 mg BID.

## 2020-03-20 ENCOUNTER — TELEPHONE (OUTPATIENT)
Dept: PSYCHIATRY | Age: 80
End: 2020-03-20

## 2020-03-23 RX ORDER — RANITIDINE 150 MG/1
TABLET ORAL
Qty: 180 TABLET | Refills: 1 | Status: SHIPPED | OUTPATIENT
Start: 2020-03-23

## 2020-03-23 RX ORDER — SIMVASTATIN 20 MG
TABLET ORAL
Qty: 90 TABLET | Refills: 1 | Status: SHIPPED | OUTPATIENT
Start: 2020-03-23 | End: 2020-09-18

## 2020-04-01 ENCOUNTER — TELEPHONE (OUTPATIENT)
Dept: PSYCHOLOGY | Age: 80
End: 2020-04-01

## 2020-04-06 ENCOUNTER — TELEPHONE (OUTPATIENT)
Dept: INTERNAL MEDICINE | Age: 80
End: 2020-04-06

## 2020-04-06 NOTE — TELEPHONE ENCOUNTER
[address] [facility] [address] called to follow up on a fax they sent for notes and an order.

## 2020-04-21 RX ORDER — QUETIAPINE FUMARATE 50 MG/1
TABLET, FILM COATED ORAL
Qty: 30 TABLET | Refills: 1 | Status: SHIPPED | OUTPATIENT
Start: 2020-04-21 | End: 2020-05-28

## 2020-05-11 RX ORDER — ENALAPRIL MALEATE AND HYDROCHLOROTHIAZIDE 10; 25 MG/1; MG/1
TABLET ORAL
Qty: 180 TABLET | Refills: 1 | Status: SHIPPED | OUTPATIENT
Start: 2020-05-11 | End: 2020-11-11

## 2020-05-28 RX ORDER — QUETIAPINE FUMARATE 50 MG/1
TABLET, FILM COATED ORAL
Qty: 30 TABLET | Refills: 1 | Status: SHIPPED | OUTPATIENT
Start: 2020-05-28 | End: 2020-06-29 | Stop reason: SDUPTHER

## 2020-06-29 RX ORDER — OMEPRAZOLE 20 MG/1
20 CAPSULE, DELAYED RELEASE ORAL 2 TIMES DAILY
Qty: 60 CAPSULE | Refills: 3 | Status: SHIPPED | OUTPATIENT
Start: 2020-06-29 | End: 2020-10-19

## 2020-06-29 RX ORDER — QUETIAPINE FUMARATE 50 MG/1
TABLET, FILM COATED ORAL
Qty: 180 TABLET | Refills: 0 | OUTPATIENT
Start: 2020-06-29

## 2020-06-29 RX ORDER — QUETIAPINE FUMARATE 50 MG/1
TABLET, FILM COATED ORAL
Qty: 30 TABLET | Refills: 1 | Status: SHIPPED | OUTPATIENT
Start: 2020-06-29 | End: 2020-08-04

## 2020-06-29 NOTE — TELEPHONE ENCOUNTER
Roxanna Graham called requesting a refill of the below medication which has been pended for you:     Requested Prescriptions     Pending Prescriptions Disp Refills    QUEtiapine (SEROQUEL) 50 MG tablet [Pharmacy Med Name: QUETIAPINE 50MG TABLETS] 180 tablet 0     Sig: TAKE 1 TABLET BY MOUTH TWICE DAILY       Last Appointment Date: 2/6/2020  Next Appointment Date: 8/12/2020    No Known Allergies

## 2020-07-29 ENCOUNTER — HOSPITAL ENCOUNTER (OUTPATIENT)
Dept: CT IMAGING | Age: 80
Discharge: HOME | End: 2020-07-29
Payer: MEDICARE

## 2020-07-29 LAB
ALBUMIN SERPL-MCNC: 4.3 G/DL (ref 3.5–5.2)
ALP BLD-CCNC: 60 U/L (ref 35–104)
ALT SERPL-CCNC: 21 U/L (ref 5–33)
ANION GAP SERPL CALCULATED.3IONS-SCNC: 17 MMOL/L (ref 7–19)
AST SERPL-CCNC: 19 U/L (ref 5–32)
BASOPHILS ABSOLUTE: 0 K/UL (ref 0–0.2)
BASOPHILS RELATIVE PERCENT: 0.4 % (ref 0–1)
BILIRUB SERPL-MCNC: 0.5 MG/DL (ref 0.2–1.2)
BUN BLDV-MCNC: 14 MG/DL (ref 8–23)
CALCIUM SERPL-MCNC: 9.7 MG/DL (ref 8.8–10.2)
CHLORIDE BLD-SCNC: 95 MMOL/L (ref 98–111)
CO2: 27 MMOL/L (ref 22–29)
CREAT SERPL-MCNC: 0.6 MG/DL (ref 0.5–0.9)
EOSINOPHILS ABSOLUTE: 0.1 K/UL (ref 0–0.6)
EOSINOPHILS RELATIVE PERCENT: 1.3 % (ref 0–5)
GFR AFRICAN AMERICAN: >59
GFR NON-AFRICAN AMERICAN: >60
GLUCOSE BLD-MCNC: 125 MG/DL (ref 74–109)
HCT VFR BLD CALC: 44.1 % (ref 37–47)
HEMOGLOBIN: 14.4 G/DL (ref 12–16)
IMMATURE GRANULOCYTES #: 0 K/UL
LYMPHOCYTES ABSOLUTE: 3 K/UL (ref 1.1–4.5)
LYMPHOCYTES RELATIVE PERCENT: 35 % (ref 20–40)
MCH RBC QN AUTO: 29 PG (ref 27–31)
MCHC RBC AUTO-ENTMCNC: 32.7 G/DL (ref 33–37)
MCV RBC AUTO: 88.9 FL (ref 81–99)
MONOCYTES ABSOLUTE: 0.6 K/UL (ref 0–0.9)
MONOCYTES RELATIVE PERCENT: 6.7 % (ref 0–10)
NEUTROPHILS ABSOLUTE: 4.8 K/UL (ref 1.5–7.5)
NEUTROPHILS RELATIVE PERCENT: 56.2 % (ref 50–65)
PDW BLD-RTO: 13.5 % (ref 11.5–14.5)
PLATELET # BLD: 356 K/UL (ref 130–400)
PMV BLD AUTO: 9.6 FL (ref 9.4–12.3)
POTASSIUM SERPL-SCNC: 3.8 MMOL/L (ref 3.5–5)
RBC # BLD: 4.96 M/UL (ref 4.2–5.4)
SODIUM BLD-SCNC: 139 MMOL/L (ref 136–145)
T4 TOTAL: 7.8 UG/DL (ref 4.5–11.7)
TOTAL PROTEIN: 7.1 G/DL (ref 6.6–8.7)
TSH SERPL DL<=0.05 MIU/L-ACNC: 1.77 UIU/ML (ref 0.27–4.2)
VITAMIN B-12: 627 PG/ML (ref 211–946)
WBC # BLD: 8.6 K/UL (ref 4.8–10.8)

## 2020-07-29 PROCEDURE — 70450 CT HEAD/BRAIN W/O DYE: CPT

## 2020-07-30 LAB — T3 TOTAL: 96 NG/DL (ref 80–200)

## 2020-08-03 NOTE — TELEPHONE ENCOUNTER
Jimmy Bolton called requesting a refill of the below medication which has been pended for you:     Requested Prescriptions     Pending Prescriptions Disp Refills    QUEtiapine (SEROQUEL) 50 MG tablet [Pharmacy Med Name: QUETIAPINE 50MG TABLETS] 30 tablet 1     Sig: TAKE 1 TABLET BY MOUTH TWICE DAILY       Last Appointment Date: 2/6/2020  Next Appointment Date: 8/12/2020    No Known Allergies

## 2020-08-04 RX ORDER — QUETIAPINE FUMARATE 50 MG/1
TABLET, FILM COATED ORAL
Qty: 30 TABLET | Refills: 1 | Status: SHIPPED | OUTPATIENT
Start: 2020-08-04 | End: 2020-09-08

## 2020-08-12 ENCOUNTER — OFFICE VISIT (OUTPATIENT)
Dept: INTERNAL MEDICINE | Age: 80
End: 2020-08-12
Payer: MEDICARE

## 2020-08-12 VITALS
BODY MASS INDEX: 37.15 KG/M2 | SYSTOLIC BLOOD PRESSURE: 121 MMHG | WEIGHT: 223 LBS | DIASTOLIC BLOOD PRESSURE: 79 MMHG | HEART RATE: 120 BPM | HEIGHT: 65 IN

## 2020-08-12 PROBLEM — F02.818 DEMENTIA ASSOCIATED WITH OTHER UNDERLYING DISEASE WITH BEHAVIORAL DISTURBANCE: Status: ACTIVE | Noted: 2020-08-12

## 2020-08-12 PROBLEM — F05 SUNDOWN SYNDROME: Status: ACTIVE | Noted: 2020-08-12

## 2020-08-12 PROCEDURE — 1036F TOBACCO NON-USER: CPT | Performed by: NURSE PRACTITIONER

## 2020-08-12 PROCEDURE — G8399 PT W/DXA RESULTS DOCUMENT: HCPCS | Performed by: NURSE PRACTITIONER

## 2020-08-12 PROCEDURE — 99214 OFFICE O/P EST MOD 30 MIN: CPT | Performed by: NURSE PRACTITIONER

## 2020-08-12 PROCEDURE — 4040F PNEUMOC VAC/ADMIN/RCVD: CPT | Performed by: NURSE PRACTITIONER

## 2020-08-12 PROCEDURE — 1123F ACP DISCUSS/DSCN MKR DOCD: CPT | Performed by: NURSE PRACTITIONER

## 2020-08-12 PROCEDURE — G8427 DOCREV CUR MEDS BY ELIG CLIN: HCPCS | Performed by: NURSE PRACTITIONER

## 2020-08-12 PROCEDURE — G8417 CALC BMI ABV UP PARAM F/U: HCPCS | Performed by: NURSE PRACTITIONER

## 2020-08-12 PROCEDURE — 93000 ELECTROCARDIOGRAM COMPLETE: CPT | Performed by: NURSE PRACTITIONER

## 2020-08-12 PROCEDURE — 1090F PRES/ABSN URINE INCON ASSESS: CPT | Performed by: NURSE PRACTITIONER

## 2020-08-12 RX ORDER — DONEPEZIL HYDROCHLORIDE 5 MG/1
2.5 TABLET, FILM COATED ORAL
COMMUNITY
Start: 2020-07-15 | End: 2022-09-26

## 2020-08-12 RX ORDER — METOPROLOL SUCCINATE 25 MG/1
12.5 TABLET, EXTENDED RELEASE ORAL DAILY
Qty: 45 TABLET | Refills: 1 | Status: SHIPPED | OUTPATIENT
Start: 2020-08-12 | End: 2021-02-03

## 2020-08-12 ASSESSMENT — ENCOUNTER SYMPTOMS
NAUSEA: 0
EYE DISCHARGE: 0
VOMITING: 0
CONSTIPATION: 0
CHOKING: 0
ABDOMINAL DISTENTION: 0
DIARRHEA: 0
TROUBLE SWALLOWING: 0
EYE ITCHING: 0
BLOOD IN STOOL: 0
STRIDOR: 0
ABDOMINAL PAIN: 0
WHEEZING: 0
COUGH: 0
SORE THROAT: 0
COLOR CHANGE: 0
SHORTNESS OF BREATH: 0

## 2020-08-12 ASSESSMENT — PATIENT HEALTH QUESTIONNAIRE - PHQ9
SUM OF ALL RESPONSES TO PHQ QUESTIONS 1-9: 1
SUM OF ALL RESPONSES TO PHQ QUESTIONS 1-9: 1

## 2020-08-12 ASSESSMENT — LIFESTYLE VARIABLES: HOW OFTEN DO YOU HAVE A DRINK CONTAINING ALCOHOL: 0

## 2020-08-12 NOTE — PROGRESS NOTES
[facility] INTERNAL MEDICINE  [address] [id number] [address] [address]  Dept: [phone]  Dept Fax: [phone]  Loc: [phone]    Estee Baker is a 67 y.o. female who presents today for her medical conditions/complaints as noted below. Estee Baker is c/fouzia Medicare AWV (Patient is here for [facility] Wellness Visit ) and Hypertension (Patient is here for routine follow up visit. Patient had labs done 7/29/2020)        HPI:     HPI   1. Hypertension. Blood pressure has been good she is on enalapril HCTZ 1025 twice daily  2. Dementia; She has fu with psych today ; no changes in her meds right now; They may do some changes today   3. Constipation with alternating diarrhea; She had lots of diarrhea with aricept 5 mg;  Better with 2.5  ;    4.  Sundowners; She is getting worse;  More aggravated ; She is angry with her daughter a lot;    5. Suicide attempts x3 over 20 years ago; She jumped off a bridge and then 2 overdose; She has had no indication of suicidal or homicidal ideations anytime recently  #6 hyperlipidemia she takes Zocor 20 daily labs are good  #7 obesity is really not something we are concerned about at this point as her mental situation takes priority. 6.  Her issues are that she wont wear underwear ; She wont bathe and her daughter is having to bathe her; She throws everything away; Chief Complaint   Patient presents with   [facility] AWV     Patient is here for Medicare Wellness Visit     Hypertension     Patient is here for routine follow up visit.  Patient had labs done 7/29/2020       Past Medical History:   Diagnosis Date    Biliary dyskinesia     Esophageal reflux     Hypertension     Idiopathic peripheral neuropathy     right foot drop, atrophy    IFG (impaired fasting glucose)     Insomnia     Major depressive disorder     history of suicide attempt, years ago     Mixed hyperlipidemia     Obesity       Past Surgical History:   Procedure Laterality Date    CHOLECYSTECTOMY      COLONOSCOPY  08/24/2017    Dr Larry Larkin repeat 5 years, diverticulosis       Vitals 8/12/2020 2/6/2020 8/6/2019 2/5/2019 11/2/2018 7/3/7935   SYSTOLIC 263 732 132 393 715 265   DIASTOLIC 79 82 86 80 80 72   Pulse 120 76 100 100 93 102   Temp - - - - - -   Resp - - 18 16 16 16   SpO2 - 97 93 96 93 96   Weight 223 lb - 230 lb 230 lb 223 lb 227 lb   Height 5' 5\" - 5' 5\" 5' 5\" 5' 5\" 5' 5\"   BMI (wt*703/ht~2) 37.11 kg/m2 - 38.27 kg/m2 38.27 kg/m2 37.11 kg/m2 37.77 kg/m2       Family History   Problem Relation Age of Onset    COPD Mother     Diabetes Father        Social History     Tobacco Use    Smoking status: Never Smoker    Smokeless tobacco: Never Used   Substance Use Topics    Alcohol use: No      Current Outpatient Medications   Medication Sig Dispense Refill    donepezil (ARICEPT) 5 MG tablet 2.5 mg       metoprolol succinate (TOPROL XL) 25 MG extended release tablet Take 0.5 tablets by mouth daily 45 tablet 1    QUEtiapine (SEROQUEL) 50 MG tablet TAKE 1 TABLET BY MOUTH TWICE DAILY 30 tablet 1    omeprazole (PRILOSEC) 20 MG delayed release capsule Take 1 capsule by mouth 2 times daily 60 capsule 3    enalapril-hydrochlorothiazide (VASERETIC) 10-25 MG per tablet TAKE 1 TABLET BY MOUTH TWICE DAILY 180 tablet 1    raNITIdine (ZANTAC) 150 MG tablet TAKE 1 TABLET BY MOUTH TWICE DAILY 180 tablet 1    simvastatin (ZOCOR) 20 MG tablet TAKE 1 TABLET BY MOUTH DAILY 90 tablet 1    mirabegron (MYRBETRIQ) 25 MG TB24 Take 1 tablet by mouth daily 30 tablet 1    calcium carbonate-vitamin D (CALCIUM 600 + D) 600-400 MG-UNIT TABS per tab Take 1 tablet by mouth 2 times daily       No current facility-administered medications for this visit.       No Known Allergies    Health Maintenance   Topic Date Due    Annual Wellness Visit (AWV)  05/29/2019    Lipid screen  11/02/2019    DTaP/Tdap/Td vaccine (1 - Tdap) 09/02/2020 (Originally 9/5/1959)   Manoj Lindo for fatigue, fever and unexpected weight change. HENT: Negative for ear discharge, ear pain, mouth sores, sore throat and trouble swallowing. Eyes: Negative for discharge, itching and visual disturbance. Respiratory: Negative for cough, choking, shortness of breath, wheezing and stridor. Cardiovascular: Negative for chest pain, palpitations and leg swelling. Gastrointestinal: Negative for abdominal distention, abdominal pain, blood in stool, constipation, diarrhea, nausea and vomiting. Endocrine: Negative for cold intolerance, polydipsia and polyuria. Genitourinary: Negative for difficulty urinating, dysuria, frequency and urgency. Musculoskeletal: Negative for arthralgias and gait problem. Skin: Negative for color change and rash. Allergic/Immunologic: Negative for food allergies and immunocompromised state. Neurological: Negative for dizziness, tremors, syncope, speech difficulty, weakness and headaches. Hematological: Negative for adenopathy. Does not bruise/bleed easily. Psychiatric/Behavioral: Negative for confusion and hallucinations. The patient is nervous/anxious. Depression    Sundowners         Objective:     Physical Exam  Constitutional:       General: She is not in acute distress. Appearance: She is well-developed. HENT:      Head: Normocephalic and atraumatic. Eyes:      General: No scleral icterus. Right eye: No discharge. Left eye: No discharge. Pupils: Pupils are equal, round, and reactive to light. Neck:      Musculoskeletal: Normal range of motion and neck supple. Thyroid: No thyromegaly. Vascular: No JVD. Cardiovascular:      Rate and Rhythm: Regular rhythm. Tachycardia present. Heart sounds: Normal heart sounds. No murmur. Pulmonary:      Effort: Pulmonary effort is normal. No respiratory distress. Breath sounds: Normal breath sounds. No wheezing or rales.    Abdominal:      General: Bowel sounds are normal. There is no distension. Palpations: Abdomen is soft. There is no mass. Tenderness: There is no abdominal tenderness. There is no guarding or rebound. Musculoskeletal: Normal range of motion. General: No tenderness. Skin:     General: Skin is warm and dry. Findings: No erythema or rash. Neurological:      Mental Status: She is alert and oriented to person, place, and time. Cranial Nerves: No cranial nerve deficit. Coordination: Coordination normal.      Deep Tendon Reflexes: Reflexes are normal and symmetric. Reflexes normal.   Psychiatric:         Mood and Affect: Mood is not depressed. Behavior: Behavior normal.         Thought Content: Thought content normal.         Judgment: Judgment normal.       /79   Pulse 120   Ht 5' 5\" (1.651 m)   Wt 223 lb (101.2 kg)   BMI 37.11 kg/m²     Assessment:       Diagnosis Orders   1. Essential hypertension     2. Tachycardia  EKG 12 lead   3. Depression, unspecified depression type     4. Dementia associated with other underlying disease with behavioral disturbance ([facility] [address]     5. SunDown syndrome     6. Mixed hyperlipidemia       Labs reviewed from 7/29/2020  Diagnostics reviewed from  Today ;    RHYTHM: sinus tach  RATE:103  IN INTERVAL:  ECTOPY: none  ISCHEMIA: Nonspecific ST depressions in the lateral leads of no significance    Plan:        Patient given educational materials - see patient instructions. Discussed use, benefit, and side effects of prescribed medications. Allpatient questions answered. Pt voiced understanding. Reviewed health maintenance. Instructed to continue current medications, diet and exercise. Patient agreed with treatment plan. Follow up as directed.    MEDICATIONS:  Orders Placed This Encounter   Medications    metoprolol succinate (TOPROL XL) 25 MG extended release tablet     Sig: Take 0.5 tablets by mouth daily     Dispense:  45 tablet     Refill:  1         ORDERS:  Orders Placed This Encounter   Procedures    EKG 12 lead       Follow-up:  Return in about 3 months (around 11/12/2020) for have labs done prior to appt. PATIENT INSTRUCTIONS:  Patient Instructions   1. Hypertension we will continue with current meds no changes are necessary  2. Dementia keep appointment with psych today ask her about Resporal  3. Constipation alternating with diarrhea has improved with the 5 mg Aricept which is fine  4. Sundowners talk  Kena Woody  today about either the Risperdal  or increasing her Seroquel or other meds to help with the sundowners and behaviors  5. History of suicide attempt nothing recently this is just to be noted in the history  6. Hyperlipidemia levels are good today continue with Zocor  7. Obesity we are not going to make any changes today     Electronically signed by BETH Glaser on 8/12/2020 at 11:52 AM    EMRDragon/transcription disclaimer:  Much of this encounter note is electronic transcription/translation of spoken language to printed texts. The electronic translation of spoken language may be erroneous, or at times,nonsensical words or phrases may be inadvertently transcribed.   Although I have reviewed the note for such errors, some may still exist.

## 2020-09-08 RX ORDER — QUETIAPINE FUMARATE 50 MG/1
TABLET, FILM COATED ORAL
Qty: 30 TABLET | Refills: 1 | Status: SHIPPED | OUTPATIENT
Start: 2020-09-08 | End: 2020-11-02

## 2020-09-18 RX ORDER — SIMVASTATIN 20 MG
TABLET ORAL
Qty: 90 TABLET | Refills: 1 | Status: SHIPPED | OUTPATIENT
Start: 2020-09-18 | End: 2021-03-22

## 2020-10-19 RX ORDER — OMEPRAZOLE 20 MG/1
CAPSULE, DELAYED RELEASE ORAL
Qty: 60 CAPSULE | Refills: 3 | Status: SHIPPED | OUTPATIENT
Start: 2020-10-19 | End: 2021-02-22

## 2020-11-02 RX ORDER — QUETIAPINE FUMARATE 50 MG/1
TABLET, FILM COATED ORAL
Qty: 30 TABLET | Refills: 1 | Status: SHIPPED | OUTPATIENT
Start: 2020-11-02 | End: 2020-12-01

## 2020-11-11 RX ORDER — ENALAPRIL MALEATE AND HYDROCHLOROTHIAZIDE 10; 25 MG/1; MG/1
TABLET ORAL
Qty: 180 TABLET | Refills: 1 | Status: SHIPPED | OUTPATIENT
Start: 2020-11-11 | End: 2021-05-06

## 2020-11-11 NOTE — TELEPHONE ENCOUNTER
Lyla Galdamez called requesting a refill of the below medication which has been pended for you:     Requested Prescriptions     Pending Prescriptions Disp Refills    enalapril-hydroCHLOROthiazide (VASERETIC) 10-25 MG per tablet [Pharmacy Med Name: ENALAPRIL-HCTZ 10-25MG TABLETS] 180 tablet 1     Sig: TAKE 1 TABLET BY MOUTH TWICE DAILY       Last Appointment Date: 8/12/2020  Next Appointment Date: 11/16/2020    No Known Allergies

## 2020-11-16 ENCOUNTER — VIRTUAL VISIT (OUTPATIENT)
Dept: INTERNAL MEDICINE | Age: 80
End: 2020-11-16
Payer: MEDICARE

## 2020-11-16 PROCEDURE — 99214 OFFICE O/P EST MOD 30 MIN: CPT | Performed by: NURSE PRACTITIONER

## 2020-11-16 PROCEDURE — G8484 FLU IMMUNIZE NO ADMIN: HCPCS | Performed by: NURSE PRACTITIONER

## 2020-11-16 PROCEDURE — 1090F PRES/ABSN URINE INCON ASSESS: CPT | Performed by: NURSE PRACTITIONER

## 2020-11-16 PROCEDURE — 1123F ACP DISCUSS/DSCN MKR DOCD: CPT | Performed by: NURSE PRACTITIONER

## 2020-11-16 PROCEDURE — G8399 PT W/DXA RESULTS DOCUMENT: HCPCS | Performed by: NURSE PRACTITIONER

## 2020-11-16 PROCEDURE — G8427 DOCREV CUR MEDS BY ELIG CLIN: HCPCS | Performed by: NURSE PRACTITIONER

## 2020-11-16 PROCEDURE — 1036F TOBACCO NON-USER: CPT | Performed by: NURSE PRACTITIONER

## 2020-11-16 PROCEDURE — G8417 CALC BMI ABV UP PARAM F/U: HCPCS | Performed by: NURSE PRACTITIONER

## 2020-11-16 PROCEDURE — 4040F PNEUMOC VAC/ADMIN/RCVD: CPT | Performed by: NURSE PRACTITIONER

## 2020-11-16 PROCEDURE — G0439 PPPS, SUBSEQ VISIT: HCPCS | Performed by: NURSE PRACTITIONER

## 2020-11-16 RX ORDER — ESCITALOPRAM OXALATE 5 MG/1
5 TABLET ORAL DAILY
Qty: 90 TABLET | Refills: 1
Start: 2020-11-16 | End: 2021-10-18

## 2020-11-16 RX ORDER — ESCITALOPRAM OXALATE 5 MG/1
TABLET ORAL
COMMUNITY
Start: 2020-11-05 | End: 2021-01-07 | Stop reason: SDUPTHER

## 2020-11-16 RX ORDER — ZOLPIDEM TARTRATE 5 MG/1
5 TABLET ORAL NIGHTLY PRN
Qty: 30 TABLET | Refills: 0 | Status: SHIPPED | OUTPATIENT
Start: 2020-11-16 | End: 2020-11-24

## 2020-11-16 RX ORDER — DIVALPROEX SODIUM 125 MG/1
TABLET, DELAYED RELEASE ORAL
COMMUNITY
Start: 2020-11-10 | End: 2021-01-07 | Stop reason: SDUPTHER

## 2020-11-16 ASSESSMENT — PATIENT HEALTH QUESTIONNAIRE - PHQ9
SUM OF ALL RESPONSES TO PHQ QUESTIONS 1-9: 0
SUM OF ALL RESPONSES TO PHQ9 QUESTIONS 1 & 2: 0
SUM OF ALL RESPONSES TO PHQ QUESTIONS 1-9: 0
1. LITTLE INTEREST OR PLEASURE IN DOING THINGS: 0
SUM OF ALL RESPONSES TO PHQ QUESTIONS 1-9: 0
2. FEELING DOWN, DEPRESSED OR HOPELESS: 0

## 2020-11-16 ASSESSMENT — ENCOUNTER SYMPTOMS
STRIDOR: 0
CHOKING: 0
EYE ITCHING: 0
SHORTNESS OF BREATH: 0
BLOOD IN STOOL: 0
TROUBLE SWALLOWING: 0
DIARRHEA: 0
VOMITING: 0
COLOR CHANGE: 0
WHEEZING: 0
ABDOMINAL DISTENTION: 0
COUGH: 0
SORE THROAT: 0
NAUSEA: 0
ABDOMINAL PAIN: 0
CONSTIPATION: 0
EYE DISCHARGE: 0

## 2020-11-16 ASSESSMENT — LIFESTYLE VARIABLES: HOW OFTEN DO YOU HAVE A DRINK CONTAINING ALCOHOL: 0

## 2020-11-16 NOTE — PATIENT INSTRUCTIONS
exposed to the sun, use a sunscreen that protects against both UVA and UVB radiation with an SPF of 30 or greater. Reapply every 2 to 3 hours or after sweating, drying off with a towel, or swimming. · Always wear a seat belt when traveling in a car. Always wear a helmet when riding a bicycle or motorcycle.

## 2020-11-16 NOTE — PROGRESS NOTES
[facility] INTERNAL MEDICINE  [address] [address]  [address] [address]  Dept: [phone]  Dept Fax: [phone]  Loc: [phone]    Ruth Canchola is a 79 y.o. female who were are performing tele health communication  for her medical conditions/complaints as noted below. Currently, our state is under umbrella of national state of emergency and we are using alternative methods of taking care of the needs of our patients so they are not exposed in our office; The patient has consented to this form of communication  Ruth Canchola is c/fouzia   Medicare AWV    THE patient is at home   Nick Parkinson is at office   Others in attendance is her daughter;        HPI:     HPI   3. Insomnia; She is concerned that she is not sleeping weel; She takes melatonin   2. GERD she is currently taking Prilosec twice daily with good control  3. Major depressive disorder she is seeing psych; They started her on divalproex and lexapro 5 mg daily  ; Their life is so much better ; She is letting her daughter give her a bath; she will keep on her clean clothes but her only problem now is that she is throwing everything away; But she is no longer agitated and cries occasionally   4. Hyperlipidemia; Stable with zocor   5. Dementia;   She is stable with aricept   Chief Complaint   Patient presents with    Medicare AWV       Past Medical History:   Diagnosis Date    Biliary dyskinesia     Esophageal reflux     Hypertension     Idiopathic peripheral neuropathy     right foot drop, atrophy    IFG (impaired fasting glucose)     Insomnia     Major depressive disorder     history of suicide attempt, years ago     Mixed hyperlipidemia     Obesity       Past Surgical History:   Procedure Laterality Date    CHOLECYSTECTOMY      COLONOSCOPY  08/24/2017    Dr Angie Orozco repeat 5 years, diverticulosis       Vitals 8/12/2020 2/6/2020 8/6/2019 2/5/2019 11/2/2018 4/0/4693   SYSTOLIC 806 (Originally [birth year]/5/1960)    Shingles Vaccine (1 of 2) 08/12/2021 (Originally 9/5/1991)    Flu vaccine (1) 11/16/2021 (Originally 9/1/2020)    Potassium monitoring  07/29/2021    Creatinine monitoring  07/29/2021    Colon cancer screen colonoscopy  08/24/2022    DEXA (modify frequency per FRAX score)  Completed    Pneumococcal 65+ years Vaccine  Completed    Hepatitis A vaccine  Aged Out    Hepatitis B vaccine  Aged Out    Hib vaccine  Aged Out    Meningococcal (ACWY) vaccine  Aged Out       Lab Results   Component Value Date    LABA1C 5.8 02/05/2019     No results found for: PSA, PSADIA  TSH   Date Value Ref Range Status   07/29/2020 1.770 0.270 - 4.200 uIU/mL Final   ]  Lab Results   Component Value Date     07/29/2020    K 3.8 07/29/2020    CL 95 (L) 07/29/2020    CO2 27 07/29/2020    BUN 14 07/29/2020    CREATININE 0.6 07/29/2020    GLUCOSE 125 (H) 07/29/2020    CALCIUM 9.7 07/29/2020    PROT 7.1 07/29/2020    LABALBU 4.3 07/29/2020    BILITOT 0.5 07/29/2020    ALKPHOS 60 07/29/2020    AST 19 07/29/2020    ALT 21 07/29/2020    LABGLOM >60 07/29/2020    GFRAA >59 07/29/2020     Lab Results   Component Value Date    CHOL 194 11/02/2018     Lab Results   Component Value Date    TRIG 147 11/02/2018     Lab Results   Component Value Date    HDL 63 (L) 11/02/2018     Lab Results   Component Value Date    LDLCALC 102 11/02/2018     Lab Results   Component Value Date     07/29/2020    K 3.8 07/29/2020    CL 95 07/29/2020    CO2 27 07/29/2020    BUN 14 07/29/2020    CREATININE 0.6 07/29/2020    GLUCOSE 125 07/29/2020    CALCIUM 9.7 07/29/2020      Lab Results   Component Value Date    WBC 8.6 07/29/2020    HGB 14.4 07/29/2020    HCT 44.1 07/29/2020    MCV 88.9 07/29/2020     07/29/2020    LYMPHOPCT 35.0 07/29/2020    RBC 4.96 07/29/2020    MCH 29.0 07/29/2020    MCHC 32.7 (L) 07/29/2020    RDW 13.5 07/29/2020     Lab Results   Component Value Date    VITD25 30.9 02/05/2019       Subjective: Review of Systems   Constitutional: Negative for fatigue, fever and unexpected weight change. HENT: Negative for ear discharge, ear pain, mouth sores, sore throat and trouble swallowing. Eyes: Negative for discharge, itching and visual disturbance. Respiratory: Negative for cough, choking, shortness of breath, wheezing and stridor. Cardiovascular: Negative for chest pain, palpitations and leg swelling. Gastrointestinal: Negative for abdominal distention, abdominal pain, blood in stool, constipation, diarrhea, nausea and vomiting. Endocrine: Negative for cold intolerance, polydipsia and polyuria. Genitourinary: Negative for difficulty urinating, dysuria, frequency and urgency. Musculoskeletal: Negative for arthralgias and gait problem. Skin: Negative for color change and rash. Allergic/Immunologic: Negative for food allergies and immunocompromised state. Neurological: Negative for dizziness, tremors, syncope, speech difficulty, weakness and headaches. Dementia   Hematological: Negative for adenopathy. Does not bruise/bleed easily. Psychiatric/Behavioral: Negative for confusion and hallucinations. Insomnia  Depression       Objective:     Physical Exam   DGEVISITPE      GENERAL:   Afebrile alert no acute distress  Respiratory no use of accessory muscles no acute distress no audible wheezing  Mental status alert oriented adequate thought processes        Vital signs were not taken at this visit as no equipment was available; There were no vitals taken for this visit. Assessment:       Diagnosis Orders   1. Primary insomnia  zolpidem (AMBIEN) 5 MG tablet   2. Dementia associated with other underlying disease with behavioral disturbance ([address]     3. Mixed hyperlipidemia     4. Depression, unspecified depression type     5.  Gastroesophageal reflux disease without esophagitis       Labs reviewedfrom July     Plan:        Patient given educational materials - see patient instructions. Discussed use, benefit, and side effects of prescribed medications. Allpatient questions answered. Pt voiced understanding. Reviewed health maintenance. Instructed to continue current medications, diet and exercise. Patient agreed with treatment plan. Follow up as directed. MEDICATIONS:  Orders Placed This Encounter   Medications    zolpidem (AMBIEN) 5 MG tablet     Sig: Take 1 tablet by mouth nightly as needed for Sleep for up to 30 days. Dispense:  30 tablet     Refill:  0         ORDERS:  No orders of the defined types were placed in this encounter. Follow-up:  No follow-ups on file. Following the remote visit today we will call you when we are available to reschedule your follow up appt      PATIENT INSTRUCTIONS:  Patient Instructions   1. Insomnia;  ambien 5 mg ; You can start with 1/2 and go up to the whole pill if needed   2. GERD; The current medical regimen is effective;  continue present plan and medications. 3.  Depression; stable with lexapro   4. Hyperlipidemia ; The current medical regimen is effective;  continue present plan and medications. 5.  Dementia; The current medical regimen is effective;  continue present plan and medications. Electronically signed by BETH Mehta on 11/16/2020 at 11:23 AM  Doxy visit   We are communicating with telehealth for the 3 month fu for controlled substance      Pursuant to the emergency declaration under the [address] [facility] waiver authority and the [employer] and [employer] Act, this Virtual Visit was conducted, with patient's consent, to reduce the patient's risk of exposure to COVID-19 and provide continuity of care for an established patient.        EMRDragon/transcription disclaimer:  Much of this encounter note is electronic  Medicare Annual Wellness Visit  Name: Delfino Palomino Date: 11/16/2020   MRN: [id number] Sex: Female Age: 79 y.o. Ethnicity: Non-/Non    : 1940 Race: Kiki Hearn is here for Medicare AWV    Screenings for behavioral, psychosocial and functional/safety risks, and cognitive dysfunction are all negative except as indicated below. These results, as well as other patient data from the [address] form, are documented in Flowsheets linked to this Encounter. No Known Allergies    Prior to Visit Medications    Medication Sig Taking? Authorizing Provider   zolpidem (AMBIEN) 5 MG tablet Take 1 tablet by mouth nightly as needed for Sleep for up to 30 days.  Yes BETH Donovan   divalproex (DEPAKOTE) 125 MG DR tablet TK 1 T PO QAM AND 1 T PO AT 3 PM  Historical Provider, MD   escitalopram (LEXAPRO) 5 MG tablet TK 1 T PO QHS  Historical Provider, MD   enalapril-hydroCHLOROthiazide (VASERETIC) 10-25 MG per tablet TAKE 1 TABLET BY MOUTH TWICE DAILY  BETH Donovan   QUEtiapine (SEROQUEL) 50 MG tablet TAKE 1 TABLET BY MOUTH TWICE DAILY  BETH Donovan   omeprazole (PRILOSEC) 20 MG delayed release capsule TAKE 1 CAPSULE BY MOUTH TWICE DAILY  BETH Donovan   simvastatin (ZOCOR) 20 MG tablet TAKE 1 TABLET BY MOUTH DAILY  BETH Donovan   donepezil (ARICEPT) 5 MG tablet 2.5 mg   Historical Provider, MD   metoprolol succinate (TOPROL XL) 25 MG extended release tablet Take 0.5 tablets by mouth daily  BETH Donovan   raNITIdine (ZANTAC) 150 MG tablet TAKE 1 TABLET BY MOUTH TWICE DAILY  BETH Donovan   mirabegron (MYRBETRIQ) 25 MG TB24 Take 1 tablet by mouth daily  BETH Donovan   calcium carbonate-vitamin D (CALCIUM 600 + D) 600-400 MG-UNIT TABS per tab Take 1 tablet by mouth 2 times daily  Historical Provider, MD       Past Medical History:   Diagnosis Date    Biliary dyskinesia     Esophageal reflux     Hypertension     Idiopathic peripheral neuropathy     right foot drop, atrophy    IFG (impaired fasting glucose)     Insomnia  Major depressive disorder     history of suicide attempt, years ago    [employer] Mixed hyperlipidemia     Obesity        Past Surgical History:   Procedure Laterality Date    CHOLECYSTECTOMY      COLONOSCOPY  08/24/2017    Dr Chrys Cranker repeat 5 years, diverticulosis       Family History   Problem Relation Age of Onset    COPD Mother     Diabetes Father        CareTeam (Including outside providers/suppliers regularly involved in providing care):   Patient Care Team:  BETH Jade as PCP - General (Nurse Practitioner [facility])  BETH Jade as PCP - [place] Provider    Wt Readings from Last 3 Encounters:   08/12/20 223 lb (101.2 kg)   08/06/19 230 lb (104.3 kg)   02/05/19 230 lb (104.3 kg)     There were no vitals filed for this visit. There is no height or weight on file to calculate BMI. Based upon direct observation of the patient, evaluation of cognition reveals global memory impairment noted. {Vital signs were not taken at this visit as no equipment was available;    DGEVISITPE      GENERAL:   Afebrile alert no acute distress  Respiratory no use of accessory muscles no acute distress no audible wheezing  Mental status alert oriented adequate thought processes          Patient's complete Health Risk Assessment and screening values have been reviewed and are found in Flowsheets. The following problems were reviewed today and where indicated follow up appointments were made and/or referrals ordered. Positive Risk Factor Screenings with Interventions:     General Health and ACP:  General  In general, how would you say your health is?: Good  In the past 7 days, have you experienced any of the following?  New or Increased Pain, New or Increased Fatigue, Loneliness, Social Isolation, Stress or Anger?: None of These  Do you get the social and emotional support that you need?: Yes  Do you have a Living Will?: Yes  Advance Directives     Power of  Living Will ACP-Advance Directive ACP-Power of     Not on File Not on File Filed Filed      General Health Risk Interventions:  · na    Health Habits/Nutrition:  Health Habits/Nutrition  Do you exercise for at least 20 minutes 2-3 times per week?: (!) No  Have you lost any weight without trying in the past 3 months?: No  Do you eat fewer than 2 meals per day?: No  Have you seen a dentist within the past year?: (!) No     Health Habits/Nutrition Interventions:  · Inadequate physical activity:  patient is not ready to increase his/her physical activity level at this time  · Dental exam overdue:  patient declines dental evaluation    Hearing/Vision:  No exam data present  Hearing/Vision  Do you or your family notice any trouble with your hearing?: (!) Yes  Do you have difficulty driving, watching TV, or doing any of your daily activities because of your eyesight?: No  Have you had an eye exam within the past year?: (!) No  Hearing/Vision Interventions:  · Hearing concerns:  patient declines any further evaluation/treatment for hearing issues    ADL:  ADLs  In the past 7 days, did you need help from others to perform any of the following everyday activities? Eating, dressing, grooming, bathing, toileting, or walking/balance?: (!) Bathing, Walking/Balance  In the past 7 days, did you need help from others to take care of any of the following?  Laundry, housekeeping, banking/finances, shopping, telephone use, food preparation, transportation, or taking medications?: (!) Shopping, Banking/Finances, Transportation, Taking Medications  ADL Interventions:  · her daugther does all of this for her     Personalized Preventive Plan   Current Health Maintenance Status  Immunization History   Administered Date(s) Administered    Influenza Virus Vaccine 11/14/2014    Influenza, High Dose (Fluzone 65 yrs and older) 11/03/2017, 11/02/2018    Influenza, Triv, inactivated, subunit, adjuvanted, IM (Fluad 65 yrs and older) 02/06/2020    Pneumococcal Conjugate 13-valent Vidya James) 11/16/2015    Pneumococcal Polysaccharide ([id number]) 11/05/2007        Health Maintenance   Topic Date Due    Annual Wellness Visit (AWV)  05/29/2019    Lipid screen  11/02/2019    DTaP/Tdap/Td vaccine (1 - Tdap) 12/07/2020 (Originally 9[birth year]5/1960)    Shingles Vaccine (1 of 2) 08/12/2021 (Originally 9/5/1991)    Flu vaccine (1) 11/16/2021 (Originally 9/1/2020)    Potassium monitoring  07/29/2021    Creatinine monitoring  07/29/2021    Colon cancer screen colonoscopy  08/24/2022    DEXA (modify frequency per FRAX score)  Completed    Pneumococcal 65+ years Vaccine  Completed    Hepatitis A vaccine  Aged Out    Hepatitis B vaccine  Aged Out    Hib vaccine  Aged Out    Meningococcal (ACWY) vaccine  Aged Out     Recommendations for Zvooq Due: see orders and patient instructions/AVS.  . Recommended screening schedule for the next 5-10 years is provided to the patient in written form: see Patient Juan Nagel was seen today for medicare awv. Diagnoses and all orders for this visit:    Primary insomnia  -     zolpidem (AMBIEN) 5 MG tablet; Take 1 tablet by mouth nightly as needed for Sleep for up to 30 days.     Dementia associated with other underlying disease with behavioral disturbance (HCC)    Mixed hyperlipidemia    Depression, unspecified depression type    Gastroesophageal reflux disease without esophagitis

## 2020-11-24 RX ORDER — ZOLPIDEM TARTRATE 5 MG/1
TABLET ORAL
Qty: 30 TABLET | Refills: 2 | Status: SHIPPED | OUTPATIENT
Start: 2020-11-24 | End: 2021-06-15

## 2020-11-24 NOTE — TELEPHONE ENCOUNTER
Ho Steven called to request a refill on her medication. Last office visit : 11/16/2020   Next office visit : Visit date not found     Last UDS: No results found for: Donold Caulk, LABBENZ, BUPRENUR, COCAIMETSCRU, GABAPENTIN, MDMA, METAMPU, OPIATESCREENURINE, OXTCOSU, PHENCYCLIDINESCREENURINE, PROPOXYPHENE, THCSCREENUR, TRICYUR    Last Yanna Creed:  Medication Contract:     Requested Prescriptions     Pending Prescriptions Disp Refills    zolpidem (AMBIEN) 5 MG tablet [Pharmacy Med Name: ZOLPIDEM 5MG TABLETS] 30 tablet 2     Sig: TAKE 1 TABLET BY MOUTH EVERY NIGHT AS NEEDED FOR SLEEP         Please approve or refuse this medication.    Fallon Hinton

## 2020-12-01 RX ORDER — QUETIAPINE FUMARATE 50 MG/1
TABLET, FILM COATED ORAL
Qty: 30 TABLET | Refills: 5 | Status: SHIPPED | OUTPATIENT
Start: 2020-12-01 | End: 2021-03-08

## 2020-12-01 NOTE — TELEPHONE ENCOUNTER
Latrelle Bourbon called requesting a refill of the below medication which has been pended for you:     Requested Prescriptions     Pending Prescriptions Disp Refills    QUEtiapine (SEROQUEL) 50 MG tablet [Pharmacy Med Name: QUETIAPINE 50MG TABLETS] 30 tablet 1     Sig: TAKE 1 TABLET BY MOUTH TWICE DAILY       Last Appointment Date: 11/16/2020  Next Appointment Date: 11/17/2021    No Known Allergies

## 2021-01-07 RX ORDER — DIVALPROEX SODIUM 125 MG/1
TABLET, DELAYED RELEASE ORAL
Qty: 60 TABLET | Refills: 1 | Status: SHIPPED | OUTPATIENT
Start: 2021-01-07 | End: 2021-01-11

## 2021-01-07 RX ORDER — ESCITALOPRAM OXALATE 5 MG/1
TABLET ORAL
Qty: 30 TABLET | Refills: 1 | Status: SHIPPED | OUTPATIENT
Start: 2021-01-07 | End: 2021-03-08

## 2021-01-07 NOTE — TELEPHONE ENCOUNTER
Uziel Newell is requesting a refill of their   Requested Prescriptions      No prescriptions requested or ordered in this encounter   Requested medication:   Patient asked if Rui Coats would be able take over this medication below:  divalproex (DEPAKOTE) 125 MG DR tablet         Last Appt:  11/16/2020  Next Appt:   Visit date not found  Preferred pharmacy: [facility] [place]

## 2021-01-07 NOTE — TELEPHONE ENCOUNTER
Requested Prescriptions     Pending Prescriptions Disp Refills    escitalopram (LEXAPRO) 5 MG tablet 30 tablet 1     Sig: TK 1 T PO QHS    divalproex (DEPAKOTE) 125 MG DR tablet 60 tablet 1     Sig: TK 1 T PO QAM AND 1 T PO AT 3 PM       Last Office Visit: 11/16/2020  Next Office Visit: Visit date not found  Last Medication Refill: 11/16/20

## 2021-01-11 RX ORDER — DIVALPROEX SODIUM 125 MG/1
TABLET, DELAYED RELEASE ORAL
Qty: 180 TABLET | Refills: 1 | Status: SHIPPED | OUTPATIENT
Start: 2021-01-11 | End: 2021-05-25

## 2021-02-03 RX ORDER — METOPROLOL SUCCINATE 25 MG/1
TABLET, EXTENDED RELEASE ORAL
Qty: 45 TABLET | Refills: 1 | Status: SHIPPED | OUTPATIENT
Start: 2021-02-03 | End: 2021-08-02

## 2021-02-03 NOTE — TELEPHONE ENCOUNTER
Duran Noe called requesting a refill of the below medication which has been pended for you:     Requested Prescriptions     Pending Prescriptions Disp Refills    metoprolol succinate (TOPROL XL) 25 MG extended release tablet [Pharmacy Med Name: METOPROLOL ER SUCCINATE 25MG TABS] 45 tablet 1     Sig: TAKE 1/2 TABLET BY MOUTH DAILY       Last Appointment Date: 11/16/2020  Next Appointment Date: 11/17/2021    No Known Allergies

## 2021-02-22 RX ORDER — OMEPRAZOLE 20 MG/1
CAPSULE, DELAYED RELEASE ORAL
Qty: 60 CAPSULE | Refills: 3 | Status: SHIPPED | OUTPATIENT
Start: 2021-02-22 | End: 2021-06-24

## 2021-02-22 NOTE — TELEPHONE ENCOUNTER
Sun Saleh called requesting a refill of the below medication which has been pended for you:     Requested Prescriptions     Pending Prescriptions Disp Refills    omeprazole (PRILOSEC) 20 MG delayed release capsule [Pharmacy Med Name: OMEPRAZOLE 20MG CAPSULES] 60 capsule 3     Sig: TAKE 1 CAPSULE BY MOUTH TWICE DAILY       Last Appointment Date: 11/16/2020  Next Appointment Date: Visit date not found    No Known Allergies

## 2021-03-08 RX ORDER — QUETIAPINE FUMARATE 50 MG/1
TABLET, FILM COATED ORAL
Qty: 30 TABLET | Refills: 5 | Status: SHIPPED | OUTPATIENT
Start: 2021-03-08 | End: 2021-06-07

## 2021-03-08 RX ORDER — ESCITALOPRAM OXALATE 5 MG/1
TABLET ORAL
Qty: 30 TABLET | Refills: 1 | Status: SHIPPED | OUTPATIENT
Start: 2021-03-08 | End: 2021-05-10

## 2021-03-08 NOTE — TELEPHONE ENCOUNTER
She needs a now visit; can be tele;  Francisco her daughter Johnny Betts;    All she has is 1 ashu fu from [facility] 11/21

## 2021-03-08 NOTE — TELEPHONE ENCOUNTER
Arvilla Lab called requesting a refill of the below medication which has been pended for you:     Requested Prescriptions     Pending Prescriptions Disp Refills    QUEtiapine (SEROQUEL) 50 MG tablet [Pharmacy Med Name: QUETIAPINE 50MG  TABLETS] 30 tablet 5     Sig: TAKE 1 TABLET BY MOUTH TWICE DAILY    escitalopram (LEXAPRO) 5 MG tablet [Pharmacy Med Name: ESCITALOPRAM 5MG TABLETS] 30 tablet 1     Sig: TAKE 1 TABLET BY MOUTH EVERY NIGHT AT BEDTIME       Last Appointment Date: 11/16/2020  Next Appointment Date: Visit date not found    No Known Allergies

## 2021-03-21 ENCOUNTER — IMMUNIZATION (OUTPATIENT)
Age: 81
End: 2021-03-21
Payer: MEDICARE

## 2021-03-21 PROCEDURE — 91300 COVID-19, PFIZER VACCINE 30MCG/0.3ML DOSE: CPT | Performed by: FAMILY MEDICINE

## 2021-03-21 PROCEDURE — 0001A COVID-19, PFIZER VACCINE 30MCG/0.3ML DOSE: CPT | Performed by: FAMILY MEDICINE

## 2021-03-22 RX ORDER — SIMVASTATIN 20 MG
TABLET ORAL
Qty: 90 TABLET | Refills: 1 | Status: SHIPPED | OUTPATIENT
Start: 2021-03-22 | End: 2021-09-13

## 2021-04-11 ENCOUNTER — IMMUNIZATION (OUTPATIENT)
Age: 81
End: 2021-04-11
Payer: MEDICARE

## 2021-04-11 PROCEDURE — 91300 COVID-19, PFIZER VACCINE 30MCG/0.3ML DOSE: CPT | Performed by: FAMILY MEDICINE

## 2021-04-11 PROCEDURE — 0002A COVID-19, PFIZER VACCINE 30MCG/0.3ML DOSE: CPT | Performed by: FAMILY MEDICINE

## 2021-05-06 RX ORDER — ENALAPRIL MALEATE AND HYDROCHLOROTHIAZIDE 10; 25 MG/1; MG/1
TABLET ORAL
Qty: 180 TABLET | Refills: 1 | Status: SHIPPED | OUTPATIENT
Start: 2021-05-06 | End: 2021-08-10

## 2021-05-06 NOTE — TELEPHONE ENCOUNTER
Brando Holland called requesting a refill of the below medication which has been pended for you:     Requested Prescriptions     Pending Prescriptions Disp Refills    enalapril-hydroCHLOROthiazide (VASERETIC) 10-25 MG per tablet [Pharmacy Med Name: ENALAPRIL-HCTZ 10-25MG TABLETS] 180 tablet 1     Sig: TAKE 1 TABLET BY MOUTH TWICE DAILY       Last Appointment Date: 11/16/2020  Next Appointment Date: Visit date not found    No Known Allergies

## 2021-05-10 RX ORDER — ESCITALOPRAM OXALATE 5 MG/1
TABLET ORAL
Qty: 30 TABLET | Refills: 1 | Status: SHIPPED | OUTPATIENT
Start: 2021-05-10 | End: 2021-07-12

## 2021-05-25 RX ORDER — DIVALPROEX SODIUM 125 MG/1
TABLET, DELAYED RELEASE ORAL
Qty: 60 TABLET | Refills: 1 | Status: SHIPPED | OUTPATIENT
Start: 2021-05-25 | End: 2021-07-15

## 2021-06-07 RX ORDER — QUETIAPINE FUMARATE 50 MG/1
TABLET, FILM COATED ORAL
Qty: 30 TABLET | Refills: 5 | Status: SHIPPED | OUTPATIENT
Start: 2021-06-07 | End: 2021-06-22

## 2021-06-07 NOTE — TELEPHONE ENCOUNTER
Igor called requesting a refill of the below medication which has been pended for you:     Requested Prescriptions     Pending Prescriptions Disp Refills    QUEtiapine (SEROQUEL) 50 MG tablet [Pharmacy Med Name: QUETIAPINE 50MG  TABLETS] 30 tablet 5     Sig: TAKE 1 TABLET BY MOUTH TWICE DAILY       Last Appointment Date: 11/16/2020  Next Appointment Date: 11/17/2021    No Known Allergies

## 2021-06-13 DIAGNOSIS — F51.01 PRIMARY INSOMNIA: ICD-10-CM

## 2021-06-15 RX ORDER — ZOLPIDEM TARTRATE 5 MG/1
TABLET ORAL
Qty: 30 TABLET | Refills: 0 | Status: SHIPPED | OUTPATIENT
Start: 2021-06-15 | End: 2021-07-19

## 2021-06-22 ENCOUNTER — OFFICE VISIT (OUTPATIENT)
Dept: INTERNAL MEDICINE | Age: 81
End: 2021-06-22
Payer: MEDICARE

## 2021-06-22 VITALS
HEIGHT: 62 IN | HEART RATE: 88 BPM | WEIGHT: 219 LBS | DIASTOLIC BLOOD PRESSURE: 60 MMHG | SYSTOLIC BLOOD PRESSURE: 120 MMHG | OXYGEN SATURATION: 97 % | BODY MASS INDEX: 40.3 KG/M2 | TEMPERATURE: 97.6 F

## 2021-06-22 DIAGNOSIS — R32 URINARY INCONTINENCE, UNSPECIFIED TYPE: ICD-10-CM

## 2021-06-22 DIAGNOSIS — F51.01 PRIMARY INSOMNIA: ICD-10-CM

## 2021-06-22 DIAGNOSIS — F02.818 DEMENTIA ASSOCIATED WITH OTHER UNDERLYING DISEASE WITH BEHAVIORAL DISTURBANCE: ICD-10-CM

## 2021-06-22 DIAGNOSIS — I10 ESSENTIAL HYPERTENSION: ICD-10-CM

## 2021-06-22 LAB
ALCOHOL URINE: NORMAL
AMPHETAMINE SCREEN, URINE: NORMAL
BARBITURATE SCREEN, URINE: NORMAL
BENZODIAZEPINE SCREEN, URINE: NORMAL
BILIRUBIN URINE: NEGATIVE
BLOOD, URINE: NEGATIVE
BUPRENORPHINE URINE: NORMAL
CLARITY: CLEAR
COCAINE METABOLITE SCREEN URINE: NORMAL
COLOR: YELLOW
FENTANYL SCREEN, URINE: NORMAL
GABAPENTIN SCREEN, URINE: NORMAL
GLUCOSE URINE: NEGATIVE MG/DL
KETONES, URINE: NEGATIVE MG/DL
LEUKOCYTE ESTERASE, URINE: NEGATIVE
MDMA URINE: NORMAL
METHADONE SCREEN, URINE: NORMAL
METHAMPHETAMINE, URINE: NORMAL
NITRITE, URINE: NEGATIVE
OPIATE SCREEN URINE: NORMAL
OXYCODONE SCREEN URINE: NORMAL
PH UA: 5.5 (ref 5–8)
PHENCYCLIDINE SCREEN URINE: NORMAL
PROPOXYPHENE SCREEN, URINE: NORMAL
PROTEIN UA: NEGATIVE MG/DL
SPECIFIC GRAVITY UA: 1.02 (ref 1–1.03)
SYNTHETIC CANNABINOIDS(K2) SCREEN, URINE: NORMAL
THC SCREEN, URINE: NORMAL
TRAMADOL SCREEN URINE: NORMAL
TRICYCLIC ANTIDEPRESSANTS, UR: NORMAL
UROBILINOGEN, URINE: 0.2 E.U./DL

## 2021-06-22 PROCEDURE — 1123F ACP DISCUSS/DSCN MKR DOCD: CPT | Performed by: NURSE PRACTITIONER

## 2021-06-22 PROCEDURE — 99214 OFFICE O/P EST MOD 30 MIN: CPT | Performed by: NURSE PRACTITIONER

## 2021-06-22 PROCEDURE — G8399 PT W/DXA RESULTS DOCUMENT: HCPCS | Performed by: NURSE PRACTITIONER

## 2021-06-22 PROCEDURE — 1036F TOBACCO NON-USER: CPT | Performed by: NURSE PRACTITIONER

## 2021-06-22 PROCEDURE — G8417 CALC BMI ABV UP PARAM F/U: HCPCS | Performed by: NURSE PRACTITIONER

## 2021-06-22 PROCEDURE — G8427 DOCREV CUR MEDS BY ELIG CLIN: HCPCS | Performed by: NURSE PRACTITIONER

## 2021-06-22 PROCEDURE — 0509F URINE INCON PLAN DOCD: CPT | Performed by: NURSE PRACTITIONER

## 2021-06-22 PROCEDURE — 80305 DRUG TEST PRSMV DIR OPT OBS: CPT | Performed by: NURSE PRACTITIONER

## 2021-06-22 PROCEDURE — 4040F PNEUMOC VAC/ADMIN/RCVD: CPT | Performed by: NURSE PRACTITIONER

## 2021-06-22 PROCEDURE — 1090F PRES/ABSN URINE INCON ASSESS: CPT | Performed by: NURSE PRACTITIONER

## 2021-06-22 RX ORDER — DIVALPROEX SODIUM 125 MG/1
125 TABLET, DELAYED RELEASE ORAL 2 TIMES DAILY
Qty: 90 TABLET | Refills: 3 | Status: SHIPPED | OUTPATIENT
Start: 2021-06-22 | End: 2021-07-26

## 2021-06-22 RX ORDER — QUETIAPINE FUMARATE 50 MG/1
TABLET, FILM COATED ORAL
Qty: 270 TABLET | Refills: 1 | Status: SHIPPED | OUTPATIENT
Start: 2021-06-22 | End: 2021-12-16

## 2021-06-22 RX ORDER — DONEPEZIL HYDROCHLORIDE 5 MG/1
5 TABLET, FILM COATED ORAL NIGHTLY
Qty: 90 TABLET | Refills: 1 | Status: SHIPPED | OUTPATIENT
Start: 2021-06-22 | End: 2021-12-22

## 2021-06-22 SDOH — ECONOMIC STABILITY: FOOD INSECURITY: WITHIN THE PAST 12 MONTHS, THE FOOD YOU BOUGHT JUST DIDN'T LAST AND YOU DIDN'T HAVE MONEY TO GET MORE.: NEVER TRUE

## 2021-06-22 SDOH — ECONOMIC STABILITY: FOOD INSECURITY: WITHIN THE PAST 12 MONTHS, YOU WORRIED THAT YOUR FOOD WOULD RUN OUT BEFORE YOU GOT MONEY TO BUY MORE.: NEVER TRUE

## 2021-06-22 ASSESSMENT — ENCOUNTER SYMPTOMS
CHOKING: 0
COLOR CHANGE: 0
SORE THROAT: 0
COUGH: 0
BLOOD IN STOOL: 0
TROUBLE SWALLOWING: 0
VOMITING: 0
SHORTNESS OF BREATH: 0
EYE ITCHING: 0
CONSTIPATION: 0
DIARRHEA: 0
WHEEZING: 0
ABDOMINAL DISTENTION: 0
STRIDOR: 0
ABDOMINAL PAIN: 0
EYE DISCHARGE: 0
NAUSEA: 0

## 2021-06-22 ASSESSMENT — SOCIAL DETERMINANTS OF HEALTH (SDOH): HOW HARD IS IT FOR YOU TO PAY FOR THE VERY BASICS LIKE FOOD, HOUSING, MEDICAL CARE, AND HEATING?: NOT HARD AT ALL

## 2021-06-22 NOTE — PROGRESS NOTES
[address] [facility]  [address] [facility] [address] [address]  Dept: [phone]  Dept Fax: [phone] 33: [phone]    Damaris Magallanes (:  1940) is a 79 y.o. female,Established patient, here for evaluation of the following chief complaint(s): 6 Month [address] [place] [address] Vishal is a 79 y.o. female who presents today for her medical conditions/complaints as noted below. Damaris Magallanes is c/fouzia 6 Month Follow-Up        HPI:     Chief Complaint   Patient presents with    6 Month Follow-Up     HPI the daughter is the historian she has had to quit her job in addition she has had she is selling her house to take care of her parents. We have done some video visits as it is extremely difficult for Richelle Orona to get her mom here. Dad is pretty independent but he has leukemia under last rounds of chemo to try to help the leukemia continues to get weaker. So she has her hands full taking care of both of her parents  1. Urinary incontinence is more stress incontinence  It has to do with her dementia as well;   2. Depression she cries uncontrollably from time to time she been seeing Dr. Chris Sanchez but she has had so much problems getting her prescriptions refilled with them that she asked if we just take her mom's prescription writing over for the Seroquel and Aricept and Depakote. 3.  Insomnia stable with ambien 5 mg at bedtime this helps her rest all night long. 4 .  Hypertension enalapril -HCTZ blood pressures controlled she has no edema I think we will just leave this the way it is for now  5. Dementia;   She  Is aricept 5 mg daily unsure if this is helping a lot but she does have behaviors which the Seroquel helps  CONTROLLED SUBSTANCE  Drug Ambien 5 mg \  diagnosis insomnia  Last Priti Gonzales 6/15/2021  Last UDS 2021  Pain contract last date 2020  Effective dose   yes     Changes this visit    none     Past Medical History:   Diagnosis Date  Biliary dyskinesia     Esophageal reflux     Hypertension     Idiopathic peripheral neuropathy     right foot drop, atrophy    IFG (impaired fasting glucose)     Insomnia     Major depressive disorder     history of suicide attempt, years ago     Mixed hyperlipidemia     Obesity       Past Surgical History:   Procedure Laterality Date    CHOLECYSTECTOMY      COLONOSCOPY  08/24/2017    Dr Candy Rangel repeat 5 years, diverticulosis       Vitals 6/22/2021 8/12/2020 2/6/2020 8/6/2019 2/5/2019 24/3/9704   SYSTOLIC [phone] 311   DIASTOLIC 60 79 82 86 80 80   Pulse 88 120 76 100 100 93   Temp 97.6 - - - - -   Resp - - - 18 16 16   SpO2 97 - 97 93 96 93   Weight 219 lb 223 lb - 230 lb 230 lb 223 lb   Height 5' 2\" 5' 5\" - 5' 5\" 5' 5\" 5' 5\"   Body mass index 40.05 kg/m2 37.11 kg/m2 - 38.27 kg/m2 38.27 kg/m2 37.11 kg/m2   Some recent data might be hidden       Family History   Problem Relation Age of Onset    COPD Mother     Diabetes Father        Social History     Tobacco Use    Smoking status: Never Smoker    Smokeless tobacco: Never Used   Substance Use Topics    Alcohol use: No      Current Outpatient Medications   Medication Sig Dispense Refill    QUEtiapine (SEROQUEL) 50 MG tablet Take 1 in the afternoon and 2 in the evening 270 tablet 1    donepezil (ARICEPT) 5 MG tablet Take 1 tablet by mouth nightly 90 tablet 1    divalproex (DEPAKOTE) 125 MG DR tablet Take 1 tablet by mouth 2 times daily 90 tablet 3    zolpidem (AMBIEN) 5 MG tablet TAKE 1 TABLET BY MOUTH EVERY NIGHT AS NEEDED FOR SLEEP 30 tablet 0    divalproex (DEPAKOTE) 125 MG DR tablet TAKE 1 TABLET BY MOUTH EVERY MORNING AND 1 TABLET BY MOUTH AT 3 PM 60 tablet 1    escitalopram (LEXAPRO) 5 MG tablet TAKE 1 TABLET BY MOUTH EVERY NIGHT AT BEDTIME 30 tablet 1    enalapril-hydroCHLOROthiazide (VASERETIC) 10-25 MG per tablet TAKE 1 TABLET BY MOUTH TWICE DAILY 180 tablet 1    simvastatin (ZOCOR) 20 MG tablet TAKE 1 TABLET BY MOUTH DAILY 90 tablet 1    omeprazole (PRILOSEC) 20 MG delayed release capsule TAKE 1 CAPSULE BY MOUTH TWICE DAILY 60 capsule 3    metoprolol succinate (TOPROL XL) 25 MG extended release tablet TAKE 1/2 TABLET BY MOUTH DAILY 45 tablet 1    escitalopram (LEXAPRO) 5 MG tablet Take 1 tablet by mouth daily 90 tablet 1    donepezil (ARICEPT) 5 MG tablet 2.5 mg       raNITIdine (ZANTAC) 150 MG tablet TAKE 1 TABLET BY MOUTH TWICE DAILY 180 tablet 1    mirabegron (MYRBETRIQ) 25 MG TB24 Take 1 tablet by mouth daily 30 tablet 1    calcium carbonate-vitamin D (CALCIUM 600 + D) 600-400 MG-UNIT TABS per tab Take 1 tablet by mouth 2 times daily       No current facility-administered medications for this visit.      No Known Allergies    Health Maintenance   Topic Date Due    Hepatitis C screen  Never done    DTaP/Tdap/Td vaccine (1 - Tdap) Never done    Lipid screen  11/02/2019    Shingles Vaccine (1 of 2) 08/12/2021 (Originally 9/5/1991)    Flu vaccine (Season Ended) 11/16/2021 (Originally 9/1/2021)    Potassium monitoring  07/29/2021    Creatinine monitoring  07/29/2021    Annual Wellness Visit (AWV)  11/17/2021    Colon cancer screen colonoscopy  08/24/2022    DEXA (modify frequency per FRAX score)  Completed    Pneumococcal 65+ years Vaccine  Completed    COVID-19 Vaccine  Completed    Hepatitis A vaccine  Aged Out    Hepatitis B vaccine  Aged Out    Hib vaccine  Aged Out    Meningococcal (ACWY) vaccine  Aged Out       Lab Results   Component Value Date    LABA1C 5.8 02/05/2019     No results found for: PSA, PSADIA  TSH   Date Value Ref Range Status   07/29/2020 1.770 0.270 - 4.200 uIU/mL Final   ]  Lab Results   Component Value Date     07/29/2020    K 3.8 07/29/2020    CL 95 (L) 07/29/2020    CO2 27 07/29/2020    BUN 14 07/29/2020    CREATININE 0.6 07/29/2020    GLUCOSE 125 (H) 07/29/2020    CALCIUM 9.7 07/29/2020    PROT 7.1 07/29/2020    LABALBU 4.3 07/29/2020    BILITOT 0.5 07/29/2020    ALKPHOS 60 07/29/2020    AST 19 07/29/2020    ALT 21 07/29/2020    LABGLOM >60 07/29/2020    GFRAA >59 07/29/2020     Lab Results   Component Value Date    CHOL 194 11/02/2018     Lab Results   Component Value Date    TRIG 147 11/02/2018     Lab Results   Component Value Date    HDL 63 (L) 11/02/2018     Lab Results   Component Value Date    LDLCALC 102 11/02/2018     Lab Results   Component Value Date     07/29/2020    K 3.8 07/29/2020    CL 95 07/29/2020    CO2 27 07/29/2020    BUN 14 07/29/2020    CREATININE 0.6 07/29/2020    GLUCOSE 125 07/29/2020    CALCIUM 9.7 07/29/2020      Lab Results   Component Value Date    WBC 8.6 07/29/2020    HGB 14.4 07/29/2020    HCT 44.1 07/29/2020    MCV 88.9 07/29/2020     07/29/2020    LYMPHOPCT 35.0 07/29/2020    RBC 4.96 07/29/2020    MCH 29.0 07/29/2020    MCHC 32.7 (L) 07/29/2020    RDW 13.5 07/29/2020     Lab Results   Component Value Date    VITD25 30.9 02/05/2019       Subjective:      Review of Systems   Constitutional: Negative for fatigue, fever and unexpected weight change. HENT: Negative for ear discharge, ear pain, mouth sores, sore throat and trouble swallowing. Eyes: Negative for discharge, itching and visual disturbance. Respiratory: Negative for cough, choking, shortness of breath, wheezing and stridor. Cardiovascular: Negative for chest pain, palpitations and leg swelling. Gastrointestinal: Negative for abdominal distention, abdominal pain, blood in stool, constipation, diarrhea, nausea and vomiting. Endocrine: Negative for cold intolerance, polydipsia and polyuria. Genitourinary: Negative for difficulty urinating, dysuria, frequency and urgency. Incontinence   Musculoskeletal: Negative for arthralgias and gait problem. Skin: Negative for color change and rash. Allergic/Immunologic: Negative for food allergies and immunocompromised state.    Neurological: Negative for dizziness, tremors, syncope, speech difficulty, weakness and SpO2 97%   BMI 40.06 kg/m²           Assessment:      Problem List     Dementia associated with other underlying disease with behavioral disturbance ([facility] [address]     She is stable on Aricept 5 mg daily          Relevant Medications    donepezil (ARICEPT) 5 MG tablet    escitalopram (LEXAPRO) 5 MG tablet    escitalopram (LEXAPRO) 5 MG tablet    divalproex (DEPAKOTE) 125 MG DR tablet    zolpidem (AMBIEN) 5 MG tablet    QUEtiapine (SEROQUEL) 50 MG tablet    donepezil (ARICEPT) 5 MG tablet    divalproex (DEPAKOTE) 125 MG DR tablet    Hypertension     Stable with enalapril HCTZ          Primary insomnia     She is stable on Ambien 5 mg          Relevant Medications    zolpidem (AMBIEN) 5 MG tablet    Urinary incontinence     This is just loss of bladder tone there is no prolapse          Relevant Medications    mirabegron (MYRBETRIQ) 25 MG TB24          Plan:        Patient given educational materials - see patient instructions. Discussed use, benefit, and side effects of prescribed medications. Allpatient questions answered. Pt voiced understanding. Reviewed health maintenance. Instructed to continue current medications, diet and exercise. Patient agreed with treatment plan. Follow up as directed. MEDICATIONS:  Orders Placed This Encounter   Medications    QUEtiapine (SEROQUEL) 50 MG tablet     Sig: Take 1 in the afternoon and 2 in the evening     Dispense:  270 tablet     Refill:  1    donepezil (ARICEPT) 5 MG tablet     Sig: Take 1 tablet by mouth nightly     Dispense:  90 tablet     Refill:  1    divalproex (DEPAKOTE) 125 MG DR tablet     Sig: Take 1 tablet by mouth 2 times daily     Dispense:  90 tablet     Refill:  3         ORDERS:  No orders of the defined types were placed in this encounter. Follow-up:  No follow-ups on file. PATIENT INSTRUCTIONS:  There are no Patient Instructions on file for this visit.   Electronically signed by BETH Mari on 6/22/2021 at 12:35 PM [employer]/transcription disclaimer:  Much of this encounter note is electronic transcription/translation of spoken language to printed texts. The electronic translation of spoken language may be erroneous, or at times,nonsensical words or phrases may be inadvertently transcribed.   Although I have reviewed the note for such errors, some may still exist.

## 2021-06-24 RX ORDER — OMEPRAZOLE 20 MG/1
CAPSULE, DELAYED RELEASE ORAL
Qty: 60 CAPSULE | Refills: 3 | Status: SHIPPED | OUTPATIENT
Start: 2021-06-24 | End: 2021-07-15 | Stop reason: SDUPTHER

## 2021-06-24 NOTE — TELEPHONE ENCOUNTER
Felicitas Bedoya called requesting a refill of the below medication which has been pended for you:     Requested Prescriptions     Pending Prescriptions Disp Refills    omeprazole (PRILOSEC) 20 MG delayed release capsule [Pharmacy Med Name: OMEPRAZOLE 20MG CAPSULES] 60 capsule 3     Sig: TAKE 1 CAPSULE BY MOUTH TWICE DAILY       Last Appointment Date: 6/22/2021  Next Appointment Date: 11/17/2021    No Known Allergies

## 2021-07-12 RX ORDER — ESCITALOPRAM OXALATE 5 MG/1
TABLET ORAL
Qty: 90 TABLET | Refills: 1 | Status: SHIPPED | OUTPATIENT
Start: 2021-07-12 | End: 2021-07-15

## 2021-07-12 NOTE — TELEPHONE ENCOUNTER
Lexy Doty called requesting a refill of the below medication which has been pended for you:     Requested Prescriptions     Pending Prescriptions Disp Refills    escitalopram (LEXAPRO) 5 MG tablet [Pharmacy Med Name: ESCITALOPRAM 5MG TABLETS] 90 tablet 1     Sig: TAKE 1 TABLET BY MOUTH EVERY NIGHT AT BEDTIME       Last Appointment Date: 6/22/2021  Next Appointment Date: 11/17/2021    No Known Allergies

## 2021-07-15 RX ORDER — OMEPRAZOLE 20 MG/1
CAPSULE, DELAYED RELEASE ORAL
Qty: 180 CAPSULE | Refills: 3 | Status: SHIPPED | OUTPATIENT
Start: 2021-07-15 | End: 2021-09-30

## 2021-07-19 DIAGNOSIS — F51.01 PRIMARY INSOMNIA: ICD-10-CM

## 2021-07-19 RX ORDER — ZOLPIDEM TARTRATE 5 MG/1
TABLET ORAL
Qty: 30 TABLET | Refills: 2 | Status: SHIPPED | OUTPATIENT
Start: 2021-07-19 | End: 2022-01-19

## 2021-07-19 NOTE — TELEPHONE ENCOUNTER
Dotty Alethanando called to request a refill on her medication. Last office visit : 6/22/2021   Next office visit : 11/17/2021     Last UDS:   Amphetamine Screen, Urine   Date Value Ref Range Status   06/22/2021 neg  Final     Barbiturate Screen, Urine   Date Value Ref Range Status   06/22/2021 neg  Final     Benzodiazepine Screen, Urine   Date Value Ref Range Status   06/22/2021 neg  Final     Buprenorphine Urine   Date Value Ref Range Status   06/22/2021 neg  Final     Cocaine Metabolite Screen, Urine   Date Value Ref Range Status   06/22/2021 neg  Final     Gabapentin Screen, Urine   Date Value Ref Range Status   06/22/2021 neg  Final     MDMA, Urine   Date Value Ref Range Status   06/22/2021 neg  Final     Methamphetamine, Urine   Date Value Ref Range Status   06/22/2021 neg  Final     Opiate Scrn, Ur   Date Value Ref Range Status   06/22/2021 neg  Final     Oxycodone Screen, Ur   Date Value Ref Range Status   06/22/2021 neg  Final     PCP Screen, Urine   Date Value Ref Range Status   06/22/2021 neg  Final     Propoxyphene Screen, Urine   Date Value Ref Range Status   06/22/2021 neg  Final     THC Screen, Urine   Date Value Ref Range Status   06/22/2021 neg  Final     Tricyclic Antidepressants, Urine   Date Value Ref Range Status   06/22/2021 neg  Final       Last Jp Gomez: 06/15/2021  Medication Contract: 06/22/2021    Requested Prescriptions     Pending Prescriptions Disp Refills    zolpidem (AMBIEN) 5 MG tablet [Pharmacy Med Name: ZOLPIDEM 5MG TABLETS] 30 tablet 2     Sig: TAKE 1 TABLET BY MOUTH EVERY NIGHT AS NEEDED FOR SLEEP         Please approve or refuse this medication.    Marion Cantrell

## 2021-07-26 RX ORDER — DIVALPROEX SODIUM 125 MG/1
TABLET, DELAYED RELEASE ORAL
Qty: 60 TABLET | Refills: 1 | Status: SHIPPED | OUTPATIENT
Start: 2021-07-26 | End: 2022-01-11

## 2021-08-02 RX ORDER — METOPROLOL SUCCINATE 25 MG/1
TABLET, EXTENDED RELEASE ORAL
Qty: 45 TABLET | Refills: 1 | Status: SHIPPED | OUTPATIENT
Start: 2021-08-02 | End: 2021-11-03

## 2021-08-02 NOTE — TELEPHONE ENCOUNTER
Luis Green called requesting a refill of the below medication which has been pended for you:     Requested Prescriptions     Pending Prescriptions Disp Refills    metoprolol succinate (TOPROL XL) 25 MG extended release tablet [Pharmacy Med Name: METOPROLOL ER SUCCINATE 25MG TABS] 45 tablet 1     Sig: TAKE 1/2 TABLET BY MOUTH DAILY       Last Appointment Date: 6/22/2021  Next Appointment Date: 11/17/2021    No Known Allergies

## 2021-08-09 DIAGNOSIS — I10 ESSENTIAL HYPERTENSION: Primary | ICD-10-CM

## 2021-08-10 RX ORDER — ENALAPRIL MALEATE AND HYDROCHLOROTHIAZIDE 10; 25 MG/1; MG/1
TABLET ORAL
Qty: 180 TABLET | Refills: 1 | Status: SHIPPED | OUTPATIENT
Start: 2021-08-10 | End: 2022-05-10

## 2021-08-10 NOTE — TELEPHONE ENCOUNTER
Ammon Mejia called requesting a refill of the below medication which has been pended for you:     Requested Prescriptions     Pending Prescriptions Disp Refills    enalapril-hydroCHLOROthiazide (VASERETIC) 10-25 MG per tablet [Pharmacy Med Name: ENALAPRIL-HCTZ 10-25MG TABLETS] 180 tablet 1     Sig: TAKE 1 TABLET BY MOUTH TWICE DAILY       Last Appointment Date: 6/22/2021  Next Appointment Date: 11/17/2021    No Known Allergies

## 2021-09-13 RX ORDER — SIMVASTATIN 20 MG
TABLET ORAL
Qty: 90 TABLET | Refills: 1 | Status: SHIPPED | OUTPATIENT
Start: 2021-09-13 | End: 2022-03-14

## 2021-09-30 RX ORDER — OMEPRAZOLE 20 MG/1
CAPSULE, DELAYED RELEASE ORAL
Qty: 180 CAPSULE | Refills: 1 | Status: SHIPPED | OUTPATIENT
Start: 2021-09-30 | End: 2022-05-01

## 2021-09-30 NOTE — TELEPHONE ENCOUNTER
Yoshi Gold called requesting a refill of the below medication which has been pended for you:     Requested Prescriptions     Pending Prescriptions Disp Refills    omeprazole (PRILOSEC) 20 MG delayed release capsule [Pharmacy Med Name: OMEPRAZOLE 20MG CAPSULES] 180 capsule 1     Sig: TAKE 1 CAPSULE BY MOUTH TWICE DAILY       Last Appointment Date: 6/22/2021  Next Appointment Date: 11/17/2021    No Known Allergies

## 2021-10-16 DIAGNOSIS — F41.9 ANXIETY: Primary | ICD-10-CM

## 2021-10-18 RX ORDER — ESCITALOPRAM OXALATE 5 MG/1
TABLET ORAL
Qty: 90 TABLET | Refills: 1 | Status: SHIPPED | OUTPATIENT
Start: 2021-10-18 | End: 2022-05-26

## 2021-10-18 NOTE — TELEPHONE ENCOUNTER
Snehal  called to request a refill on her medication.       Last office visit : 6/22/2021   Next office visit : 11/17/2021     Requested Prescriptions     Pending Prescriptions Disp Refills    escitalopram (LEXAPRO) 5 MG tablet [Pharmacy Med Name: ESCITALOPRAM 5MG TABLETS] 90 tablet 1     Sig: TAKE 1 TABLET BY MOUTH EVERY NIGHT AT BEDTIME            Yeni Tuttle MA

## 2021-11-03 RX ORDER — METOPROLOL SUCCINATE 25 MG/1
TABLET, EXTENDED RELEASE ORAL
Qty: 45 TABLET | Refills: 1 | Status: SHIPPED | OUTPATIENT
Start: 2021-11-03 | End: 2022-06-16

## 2021-11-15 PROBLEM — K62.5 RECTAL BLEEDING: Status: ACTIVE | Noted: 2017-08-09

## 2021-11-17 ENCOUNTER — OFFICE VISIT (OUTPATIENT)
Dept: INTERNAL MEDICINE | Age: 81
End: 2021-11-17
Payer: MEDICARE

## 2021-11-17 VITALS
HEIGHT: 65 IN | OXYGEN SATURATION: 96 % | BODY MASS INDEX: 35.82 KG/M2 | SYSTOLIC BLOOD PRESSURE: 130 MMHG | DIASTOLIC BLOOD PRESSURE: 64 MMHG | HEART RATE: 90 BPM | WEIGHT: 215 LBS

## 2021-11-17 DIAGNOSIS — E55.9 VITAMIN D DEFICIENCY: ICD-10-CM

## 2021-11-17 DIAGNOSIS — I10 PRIMARY HYPERTENSION: ICD-10-CM

## 2021-11-17 DIAGNOSIS — E78.2 MIXED HYPERLIPIDEMIA: ICD-10-CM

## 2021-11-17 DIAGNOSIS — I10 PRIMARY HYPERTENSION: Primary | ICD-10-CM

## 2021-11-17 DIAGNOSIS — Z00.00 ROUTINE GENERAL MEDICAL EXAMINATION AT A HEALTH CARE FACILITY: ICD-10-CM

## 2021-11-17 LAB
ALBUMIN SERPL-MCNC: 4.1 G/DL (ref 3.5–5.2)
ALP BLD-CCNC: 63 U/L (ref 35–104)
ALT SERPL-CCNC: 17 U/L (ref 5–33)
ANION GAP SERPL CALCULATED.3IONS-SCNC: 15 MMOL/L (ref 7–19)
AST SERPL-CCNC: 18 U/L (ref 5–32)
BASOPHILS ABSOLUTE: 0 K/UL (ref 0–0.2)
BASOPHILS RELATIVE PERCENT: 0.3 % (ref 0–1)
BILIRUB SERPL-MCNC: 0.3 MG/DL (ref 0.2–1.2)
BUN BLDV-MCNC: 18 MG/DL (ref 8–23)
CALCIUM SERPL-MCNC: 9.4 MG/DL (ref 8.8–10.2)
CHLORIDE BLD-SCNC: 95 MMOL/L (ref 98–111)
CHOLESTEROL, TOTAL: 167 MG/DL (ref 160–199)
CO2: 29 MMOL/L (ref 22–29)
CREAT SERPL-MCNC: 0.6 MG/DL (ref 0.5–0.9)
EOSINOPHILS ABSOLUTE: 0 K/UL (ref 0–0.6)
EOSINOPHILS RELATIVE PERCENT: 0.4 % (ref 0–5)
GFR AFRICAN AMERICAN: >59
GFR NON-AFRICAN AMERICAN: >60
GLUCOSE BLD-MCNC: 117 MG/DL (ref 74–109)
HCT VFR BLD CALC: 45.1 % (ref 37–47)
HDLC SERPL-MCNC: 60 MG/DL (ref 65–121)
HEMOGLOBIN: 14 G/DL (ref 12–16)
IMMATURE GRANULOCYTES #: 0 K/UL
LDL CHOLESTEROL CALCULATED: 78 MG/DL
LYMPHOCYTES ABSOLUTE: 2.9 K/UL (ref 1.1–4.5)
LYMPHOCYTES RELATIVE PERCENT: 32.1 % (ref 20–40)
MCH RBC QN AUTO: 28.7 PG (ref 27–31)
MCHC RBC AUTO-ENTMCNC: 31 G/DL (ref 33–37)
MCV RBC AUTO: 92.6 FL (ref 81–99)
MONOCYTES ABSOLUTE: 0.5 K/UL (ref 0–0.9)
MONOCYTES RELATIVE PERCENT: 6 % (ref 0–10)
NEUTROPHILS ABSOLUTE: 5.4 K/UL (ref 1.5–7.5)
NEUTROPHILS RELATIVE PERCENT: 60.9 % (ref 50–65)
PDW BLD-RTO: 13.9 % (ref 11.5–14.5)
PLATELET # BLD: 343 K/UL (ref 130–400)
PMV BLD AUTO: 9.7 FL (ref 9.4–12.3)
POTASSIUM SERPL-SCNC: 4.5 MMOL/L (ref 3.5–5)
RBC # BLD: 4.87 M/UL (ref 4.2–5.4)
SODIUM BLD-SCNC: 139 MMOL/L (ref 136–145)
TOTAL PROTEIN: 7.1 G/DL (ref 6.6–8.7)
TRIGL SERPL-MCNC: 145 MG/DL (ref 0–149)
TSH SERPL DL<=0.05 MIU/L-ACNC: 3.17 UIU/ML (ref 0.27–4.2)
VITAMIN D 25-HYDROXY: 30.5 NG/ML
WBC # BLD: 8.9 K/UL (ref 4.8–10.8)

## 2021-11-17 PROCEDURE — 4040F PNEUMOC VAC/ADMIN/RCVD: CPT | Performed by: NURSE PRACTITIONER

## 2021-11-17 PROCEDURE — 1123F ACP DISCUSS/DSCN MKR DOCD: CPT | Performed by: NURSE PRACTITIONER

## 2021-11-17 PROCEDURE — G8399 PT W/DXA RESULTS DOCUMENT: HCPCS | Performed by: NURSE PRACTITIONER

## 2021-11-17 PROCEDURE — 90694 VACC AIIV4 NO PRSRV 0.5ML IM: CPT | Performed by: NURSE PRACTITIONER

## 2021-11-17 PROCEDURE — G8484 FLU IMMUNIZE NO ADMIN: HCPCS | Performed by: NURSE PRACTITIONER

## 2021-11-17 PROCEDURE — G0439 PPPS, SUBSEQ VISIT: HCPCS | Performed by: NURSE PRACTITIONER

## 2021-11-17 PROCEDURE — G0008 ADMIN INFLUENZA VIRUS VAC: HCPCS | Performed by: NURSE PRACTITIONER

## 2021-11-17 PROCEDURE — 1036F TOBACCO NON-USER: CPT | Performed by: NURSE PRACTITIONER

## 2021-11-17 PROCEDURE — G8417 CALC BMI ABV UP PARAM F/U: HCPCS | Performed by: NURSE PRACTITIONER

## 2021-11-17 PROCEDURE — G8427 DOCREV CUR MEDS BY ELIG CLIN: HCPCS | Performed by: NURSE PRACTITIONER

## 2021-11-17 PROCEDURE — 1090F PRES/ABSN URINE INCON ASSESS: CPT | Performed by: NURSE PRACTITIONER

## 2021-11-17 PROCEDURE — 99213 OFFICE O/P EST LOW 20 MIN: CPT | Performed by: NURSE PRACTITIONER

## 2021-11-17 RX ORDER — BUPROPION HYDROCHLORIDE 150 MG/1
150 TABLET ORAL EVERY MORNING
Qty: 30 TABLET | Refills: 3 | Status: SHIPPED | OUTPATIENT
Start: 2021-11-17 | End: 2022-05-03

## 2021-11-17 ASSESSMENT — ENCOUNTER SYMPTOMS
WHEEZING: 0
COLOR CHANGE: 0
EYE DISCHARGE: 0
ABDOMINAL PAIN: 0
COUGH: 0
SORE THROAT: 0
VOMITING: 0
TROUBLE SWALLOWING: 0
DIARRHEA: 0
SHORTNESS OF BREATH: 0
NAUSEA: 0
EYE ITCHING: 0
CHOKING: 0
BLOOD IN STOOL: 0
STRIDOR: 0
CONSTIPATION: 0
ABDOMINAL DISTENTION: 0

## 2021-11-17 ASSESSMENT — PATIENT HEALTH QUESTIONNAIRE - PHQ9
SUM OF ALL RESPONSES TO PHQ QUESTIONS 1-9: 0
1. LITTLE INTEREST OR PLEASURE IN DOING THINGS: 0
SUM OF ALL RESPONSES TO PHQ9 QUESTIONS 1 & 2: 0
SUM OF ALL RESPONSES TO PHQ QUESTIONS 1-9: 0
2. FEELING DOWN, DEPRESSED OR HOPELESS: 0
SUM OF ALL RESPONSES TO PHQ QUESTIONS 1-9: 0

## 2021-11-17 ASSESSMENT — LIFESTYLE VARIABLES: HOW OFTEN DO YOU HAVE A DRINK CONTAINING ALCOHOL: 0

## 2021-11-17 NOTE — PATIENT INSTRUCTIONS
1.  Obsessions we will try Wellbutrin to see if that will help  2. Hypertension blood pressure is good today will stay on enalapril/HCTZ 10/25  Personalized Preventive Plan for Nicko Auburn - 11/17/2021  Medicare offers a range of preventive health benefits. Some of the tests and screenings are paid in full while other may be subject to a deductible, co-insurance, and/or copay. Some of these benefits include a comprehensive review of your medical history including lifestyle, illnesses that may run in your family, and various assessments and screenings as appropriate. After reviewing your medical record and screening and assessments performed today your provider may have ordered immunizations, labs, imaging, and/or referrals for you. A list of these orders (if applicable) as well as your Preventive Care list are included within your After Visit Summary for your review. Other Preventive Recommendations:    · A preventive eye exam performed by an eye specialist is recommended every 1-2 years to screen for glaucoma; cataracts, macular degeneration, and other eye disorders. · A preventive dental visit is recommended every 6 months. · Try to get at least 150 minutes of exercise per week or 10,000 steps per day on a pedometer . · Order or download the FREE \"Exercise & Physical Activity: Your Everyday Guide\" from [employer]. Call 0-331.920.8255 or search [employer] online. · You need 9257-1956 mg of calcium and 9131-9349 IU of vitamin D per day. It is possible to meet your calcium requirement with diet alone, but a vitamin D supplement is usually necessary to meet this goal.  · When exposed to the sun, use a sunscreen that protects against both UVA and UVB radiation with an SPF of 30 or greater. Reapply every 2 to 3 hours or after sweating, drying off with a towel, or swimming. · Always wear a seat belt when traveling in a car.  Always wear a helmet when riding a bicycle or motorcycle.

## 2021-11-17 NOTE — PROGRESS NOTES
[address] [facility]  [address] [place] [address]  [address] [address]  Dept: [phone]  Dept Fax: [phone]: [phone]    Shamar Hoover (:  1940) is a [de-identified] y.o. female,Established patient na, here for evaluation of the following chief complaint(s): Medicare AW      Shamar Hoover is a [de-identified] y.o. female who presents today for her medical conditions/complaints as noted below. Shamar Hoover is c/fouzia Medicare AWV        HPI:     Chief Complaint   Patient presents with    Medicare AWV     HPI   1. Obsession   She eats all the food Dankolinda Vargas brings her in 3-4 hours; She is up all night eating anything she can get her hands on   2. Transport chair she has difficulty walking any distance at all; she has a Rollator walker that she can use in the house but for getting her out for doctor's office appointments etc. they really need a wheelchair and they need a light weight transport chair so the daughter can lift it in and out of her car  3. Dementia; her  just recently  her daughter has been helping them and being at their house more than 50% of the time. She does all the finances all the meds. She plans to get cameras for the house.       Past Medical History:   Diagnosis Date    Biliary dyskinesia     Esophageal reflux     Hypertension     Idiopathic peripheral neuropathy     right foot drop, atrophy    IFG (impaired fasting glucose)     Insomnia     Major depressive disorder     history of suicide attempt, years ago     Mixed hyperlipidemia     Obesity       Past Surgical History:   Procedure Laterality Date    CHOLECYSTECTOMY      COLONOSCOPY  2017    Dr Nobles Peer repeat 5 years, diverticulosis       Vitals [id number]   SYSTOLIC [phone]   DIASTOLIC 64 60 79 82 86 80   Pulse 90 88 120 76 100 100   Temp - 97.6 - - - -   Resp - - - - 18 16   SpO2 96 97 - 97 93 96 Weight 215 lb 219 lb 223 lb - 230 lb 230 lb   Height 5' 5\" 5' 2\" 5' 5\" - 5' 5\" 5' 5\"   Body mass index 35.77 kg/m2 40.05 kg/m2 37.11 kg/m2 - 38.27 kg/m2 38.27 kg/m2   Some recent data might be hidden       Family History   Problem Relation Age of Onset    COPD Mother     Diabetes Father        Social History     Tobacco Use    Smoking status: Never Smoker    Smokeless tobacco: Never Used   Substance Use Topics    Alcohol use: No      Current Outpatient Medications   Medication Sig Dispense Refill    buPROPion (WELLBUTRIN XL) 150 MG extended release tablet Take 1 tablet by mouth every morning 30 tablet 3    metoprolol succinate (TOPROL XL) 25 MG extended release tablet TAKE 1/2 TABLET BY MOUTH DAILY 45 tablet 1    escitalopram (LEXAPRO) 5 MG tablet TAKE 1 TABLET BY MOUTH EVERY NIGHT AT BEDTIME 90 tablet 1    omeprazole (PRILOSEC) 20 MG delayed release capsule TAKE 1 CAPSULE BY MOUTH TWICE DAILY 180 capsule 1    simvastatin (ZOCOR) 20 MG tablet TAKE 1 TABLET BY MOUTH DAILY 90 tablet 1    enalapril-hydroCHLOROthiazide (VASERETIC) 10-25 MG per tablet TAKE 1 TABLET BY MOUTH TWICE DAILY 180 tablet 1    divalproex (DEPAKOTE) 125 MG DR tablet TAKE 1 TABLET BY MOUTH EVERY MORNING AND 1 TABLET BY MOUTH AT 3 PM 60 tablet 1    QUEtiapine (SEROQUEL) 50 MG tablet Take 1 in the afternoon and 2 in the evening 270 tablet 1    donepezil (ARICEPT) 5 MG tablet Take 1 tablet by mouth nightly 90 tablet 1    donepezil (ARICEPT) 5 MG tablet 2.5 mg       raNITIdine (ZANTAC) 150 MG tablet TAKE 1 TABLET BY MOUTH TWICE DAILY 180 tablet 1    mirabegron (MYRBETRIQ) 25 MG TB24 Take 1 tablet by mouth daily 30 tablet 1    calcium carbonate-vitamin D (CALCIUM 600 + D) 600-400 MG-UNIT TABS per tab Take 1 tablet by mouth 2 times daily       No current facility-administered medications for this visit.      No Known Allergies    Health Maintenance   Topic Date Due    Lipid screen  11/02/2019    Potassium monitoring  07/29/2021    Creatinine monitoring  07/29/2021    Flu vaccine (1) 09/01/2021    COVID-19 Vaccine (3 - Booster for Dan Peter series) 10/11/2021    Annual Wellness Visit (AWV)  11/17/2021    DTaP/Tdap/Td vaccine (1 - Tdap) 12/08/2021 (Originally 9[birth year]5/1960)    Shingles Vaccine (1 of 2) 11/17/2022 (Originally 9/5/1991)    Colon cancer screen colonoscopy  08/24/2022    DEXA (modify frequency per FRAX score)  Completed    Pneumococcal 65+ years Vaccine  Completed    Hepatitis A vaccine  Aged Out    Hepatitis B vaccine  Aged Out    Hib vaccine  Aged Out    Meningococcal (ACWY) vaccine  Aged Out       Lab Results   Component Value Date    LABA1C 5.8 02/05/2019     No results found for: PSA, PSADIA  TSH   Date Value Ref Range Status   07/29/2020 1.770 0.270 - 4.200 uIU/mL Final   ]  Lab Results   Component Value Date     07/29/2020    K 3.8 07/29/2020    CL 95 (L) 07/29/2020    CO2 27 07/29/2020    BUN 14 07/29/2020    CREATININE 0.6 07/29/2020    GLUCOSE 125 (H) 07/29/2020    CALCIUM 9.7 07/29/2020    PROT 7.1 07/29/2020    LABALBU 4.3 07/29/2020    BILITOT 0.5 07/29/2020    ALKPHOS 60 07/29/2020    AST 19 07/29/2020    ALT 21 07/29/2020    LABGLOM >60 07/29/2020    GFRAA >59 07/29/2020     Lab Results   Component Value Date    CHOL 194 11/02/2018     Lab Results   Component Value Date    TRIG 147 11/02/2018     Lab Results   Component Value Date    HDL 63 (L) 11/02/2018     Lab Results   Component Value Date    LDLCALC 102 11/02/2018     Lab Results   Component Value Date     07/29/2020    K 3.8 07/29/2020    CL 95 07/29/2020    CO2 27 07/29/2020    BUN 14 07/29/2020    CREATININE 0.6 07/29/2020    GLUCOSE 125 07/29/2020    CALCIUM 9.7 07/29/2020      Lab Results   Component Value Date    WBC 8.6 07/29/2020    HGB 14.4 07/29/2020    HCT 44.1 07/29/2020    MCV 88.9 07/29/2020     07/29/2020    LYMPHOPCT 35.0 07/29/2020    RBC 4.96 07/29/2020    MCH 29.0 07/29/2020    MCHC 32.7 (L) 07/29/2020    RDW 13.5 07/29/2020     Lab Results   Component Value Date    VITD25 30.9 02/05/2019     Labs reviewed from day    Subjective:      Review of Systems   Constitutional: Negative for fatigue, fever and unexpected weight change. HENT: Positive for hearing loss. Negative for ear discharge, ear pain, mouth sores, sore throat and trouble swallowing. Eyes: Negative for discharge, itching and visual disturbance. Respiratory: Negative for cough, choking, shortness of breath, wheezing and stridor. Cardiovascular: Negative for chest pain, palpitations and leg swelling. Gastrointestinal: Negative for abdominal distention, abdominal pain, blood in stool, constipation, diarrhea, nausea and vomiting. Endocrine: Negative for cold intolerance, polydipsia and polyuria. Genitourinary: Negative for difficulty urinating, dysuria, frequency and urgency. Musculoskeletal: Negative for arthralgias and gait problem. Skin: Negative for color change and rash. Allergic/Immunologic: Negative for food allergies and immunocompromised state. Neurological: Negative for dizziness, tremors, syncope, speech difficulty, weakness and headaches. Hematological: Negative for adenopathy. Does not bruise/bleed easily. Psychiatric/Behavioral: Negative for confusion and hallucinations. The patient is nervous/anxious. Obsessions       Objective:     Physical Exam  Constitutional:       General: She is not in acute distress. Appearance: She is well-developed. HENT:      Head: Normocephalic and atraumatic. Ears:      Comments: She is totally deaf  Eyes:      General: No scleral icterus. Right eye: No discharge. Left eye: No discharge. Pupils: Pupils are equal, round, and reactive to light. Neck:      Thyroid: No thyromegaly. Vascular: No JVD. Cardiovascular:      Rate and Rhythm: Normal rate and regular rhythm. Heart sounds: Normal heart sounds. No murmur heard.       Pulmonary:      Effort: Pulmonary effort is normal. No respiratory distress. Breath sounds: Normal breath sounds. No wheezing or rales. Abdominal:      General: Bowel sounds are normal. There is no distension. Palpations: Abdomen is soft. There is no mass. Tenderness: There is no abdominal tenderness. There is no guarding or rebound. Musculoskeletal:         General: No tenderness. Normal range of motion. Cervical back: Normal range of motion and neck supple. Skin:     General: Skin is warm and dry. Findings: No erythema or rash. Neurological:      Mental Status: She is alert and oriented to person, place, and time. Cranial Nerves: No cranial nerve deficit. Coordination: Coordination normal.      Deep Tendon Reflexes: Reflexes are normal and symmetric. Reflexes normal.   Psychiatric:         Mood and Affect: Mood is not depressed. Behavior: Behavior normal.         Thought Content: Thought content normal.         Judgment: Judgment normal.      Comments: She does not talk a lot she sits with her eyes closed. /64   Pulse 90   Ht 5' 5\" (1.651 m)   Wt 215 lb (97.5 kg)   SpO2 96%   BMI 35.78 kg/m²           Assessment:      Problem List     Hypertension - Primary    Relevant Medications    enalapril-hydroCHLOROthiazide (VASERETIC) 10-25 MG per tablet    Other Relevant Orders    CBC Auto Differential    Comprehensive Metabolic Panel    TSH without Reflex    Mixed hyperlipidemia    Relevant Medications    enalapril-hydroCHLOROthiazide (VASERETIC) 10-25 MG per tablet    simvastatin (ZOCOR) 20 MG tablet    metoprolol succinate (TOPROL XL) 25 MG extended release tablet    Other Relevant Orders    Lipid Panel          Plan:        Patient given educational materials - see patient instructions. Discussed use, benefit, and side effects of prescribed medications. Allpatient questions answered. Pt voiced understanding. Reviewed health maintenance.   Instructed to continue current Allergies      Prior to Visit Medications    Medication Sig Taking?  Authorizing Provider   buPROPion (WELLBUTRIN XL) 150 MG extended release tablet Take 1 tablet by mouth every morning Yes BETH Loyd   metoprolol succinate (TOPROL XL) 25 MG extended release tablet TAKE 1/2 TABLET BY MOUTH DAILY  BETH Loyd   escitalopram (LEXAPRO) 5 MG tablet TAKE 1 TABLET BY MOUTH EVERY NIGHT AT BEDTIME  BETH Loyd   omeprazole (PRILOSEC) 20 MG delayed release capsule TAKE 1 CAPSULE BY MOUTH TWICE DAILY  BETH Loyd   simvastatin (ZOCOR) 20 MG tablet TAKE 1 TABLET BY MOUTH DAILY  BETH Loyd   enalapril-hydroCHLOROthiazide (VASERETIC) 10-25 MG per tablet TAKE 1 TABLET BY MOUTH TWICE DAILY  BETH Loyd   divalproex (DEPAKOTE) 125 MG DR tablet TAKE 1 TABLET BY MOUTH EVERY MORNING AND 1 TABLET BY MOUTH AT 3 PM  BETH Loyd   QUEtiapine (SEROQUEL) 50 MG tablet Take 1 in the afternoon and 2 in the evening  BETH Loyd   donepezil (ARICEPT) 5 MG tablet Take 1 tablet by mouth nightly  BETH Loyd   donepezil (ARICEPT) 5 MG tablet 2.5 mg   Historical Provider, MD   raNITIdine (ZANTAC) 150 MG tablet TAKE 1 TABLET BY MOUTH TWICE DAILY  BETH Loyd   mirabegron (MYRBETRIQ) 25 MG TB24 Take 1 tablet by mouth daily  BETH Loyd   calcium carbonate-vitamin D (CALCIUM 600 + D) 600-400 MG-UNIT TABS per tab Take 1 tablet by mouth 2 times daily  Historical Provider, MD         Past Medical History:   Diagnosis Date    Biliary dyskinesia     Esophageal reflux     Hypertension     Idiopathic peripheral neuropathy     right foot drop, atrophy    IFG (impaired fasting glucose)     Insomnia     Major depressive disorder     history of suicide attempt, years ago     Mixed hyperlipidemia     Obesity        Past Surgical History:   Procedure Laterality Date    CHOLECYSTECTOMY      COLONOSCOPY  08/24/2017    Dr Naldo Parekh repeat 5 years, diverticulosis Family History   Problem Relation Age of Onset    COPD Mother     Diabetes Father        CareTeam (Including outside providers/suppliers regularly involved in providing care):   Patient Care Team:  BETH Jovel as PCP - General (Nurse Practitioner Acute Care)  BETH Jovel as PCP - [facility] Empaneled Provider    Wt Readings from Last 3 Encounters:   11/17/21 215 lb (97.5 kg)   06/22/21 219 lb (99.3 kg)   08/12/20 223 lb (101.2 kg)     Vitals:    11/17/21 1055   BP: 130/64   Pulse: 90   SpO2: 96%   Weight: 215 lb (97.5 kg)   Height: 5' 5\" (1.651 m)     Body mass index is 35.78 kg/m². Based upon direct observation of the patient, evaluation of cognition reveals global memory impairment noted. Patient's complete Health Risk Assessment and screening values have been reviewed and are found in Flowsheets. The following problems were reviewed today and where indicated follow up appointments were made and/or referrals ordered. Positive Risk Factor Screenings with Interventions:      Cognitive: Words recalled: 2 Words Recalled  Total Score Interpretation: Positive Mini-Cog  Did the patient refuse to take the cognition test?: No  Cognitive Impairment Interventions:  · Patient declines any further evaluation/treatment for cognitive impairment         General Health and ACP:  General  In general, how would you say your health is?: Good  In the past 7 days, have you experienced any of the following?  New or Increased Pain, New or Increased Fatigue, Loneliness, Social Isolation, Stress or Anger?: None of These  Do you get the social and emotional support that you need?: Yes  Do you have a Living Will?: Yes  Advance Directives     Power of [address] Will ACP-Advance Directive ACP-Power of     Not on File Not on File Not on File Not on File      General Health Risk Interventions:  · Her daughter takes care of everything for her and is at her house more than 50% of the time other than working. Health Habits/Nutrition:  Health Habits/Nutrition  Do you exercise for at least 20 minutes 2-3 times per week?: Yes  Have you lost any weight without trying in the past 3 months?: No  Do you eat only one meal per day?: No  Have you seen the dentist within the past year?: Yes  Body mass index: (!) 35.77  Health Habits/Nutrition Interventions:  · She is not in any mental or physical    Hearing/Vision:  No exam data present  Hearing/Vision  Do you or your family notice any trouble with your hearing that hasn't been managed with hearing aids?: (!) Yes  Do you have difficulty driving, watching TV, or doing any of your daily activities because of your eyesight?: No  Have you had an eye exam within the past year?: Yes  Hearing/Vision Interventions:  · Hearing concerns:  She will not wear hearing aids     ADL:  ADLs  In the past 7 days, did you need help from others to perform any of the following everyday activities? Eating, dressing, grooming, bathing, toileting, or walking/balance?: (!) Bathing  In the past 7 days, did you need help from others to take care of any of the following?  Laundry, housekeeping, banking/finances, shopping, telephone use, food preparation, transportation, or taking medications?: (!) Banking/Finances, Shopping  ADL Interventions:  · Patient declines any further evaluation/treatment for this issue  Her daughter takes care of this she cannot physically or mentally take care of anything    Personalized Preventive Plan   Current Health Maintenance Status  Immunization History   Administered Date(s) Administered    COVID-19, CurbStand, PF, 30mcg/0.3mL 03/21/2021, 04/11/2021    Influenza Virus Vaccine 11/14/2014    Influenza, High Dose (Fluzone 65 yrs and older) 11/03/2017, 11/02/2018    Influenza, Triv, inactivated, subunit, adjuvanted, IM (Fluad 65 yrs and older) 02/06/2020    Pneumococcal Conjugate 13-valent ([id number]) 11/16/2015    Pneumococcal Polysaccharide ([id number]) 11/05/2007 Health Maintenance   Topic Date Due    Lipid screen  11/02/2019    Potassium monitoring  07/29/2021    Creatinine monitoring  07/29/2021    Flu vaccine (1) 09/01/2021    COVID-19 Vaccine (3 - Booster for Dan Petar series) 10/11/2021    Annual Wellness Visit (AWV)  11/17/2021    DTaP/Tdap/Td vaccine (1 - Tdap) 12/08/2021 (Originally 9/5/1960)    Shingles Vaccine (1 of 2) 11/17/2022 (Originally 9/5/1991)    Colon cancer screen colonoscopy  08/24/2022    DEXA (modify frequency per FRAX score)  Completed    Pneumococcal 65+ years Vaccine  Completed    Hepatitis A vaccine  Aged Out    Hepatitis B vaccine  Aged Out    Hib vaccine  Aged Out    Meningococcal (ACWY) vaccine  Aged Out     Recommendations for [employer] Due: see orders and patient instructions/AVS.  . Recommended screening schedule for the next 5-10 years is provided to the patient in written form: see Patient Eusebio Ann was seen today for medicare awv. Diagnoses and all orders for this visit:    Primary hypertension  -     CBC Auto Differential; Future  -     Comprehensive Metabolic Panel; Future  -     TSH without Reflex; Future    Vitamin D deficiency  -     Vitamin D 25 Hydroxy; Future    Mixed hyperlipidemia  -     Lipid Panel; Future    Other orders  -     buPROPion (WELLBUTRIN XL) 150 MG extended release tablet;  Take 1 tablet by mouth every morning

## 2021-12-16 RX ORDER — QUETIAPINE FUMARATE 50 MG/1
TABLET, FILM COATED ORAL
Qty: 270 TABLET | Refills: 1 | Status: SHIPPED | OUTPATIENT
Start: 2021-12-16 | End: 2022-07-05

## 2021-12-16 NOTE — TELEPHONE ENCOUNTER
Ilan Hamilton called to request a refill on her medication.       Last office visit : 11/17/2021   Next office visit : 11/23/2022    Requested Prescriptions     Pending Prescriptions Disp Refills    QUEtiapine (SEROQUEL) 50 MG tablet [Pharmacy Med Name: QUETIAPINE 50MG  TABLETS] 270 tablet 1     Sig: TAKE ONE TABLET BY MOUTH IN THE AFTERNOON AND TAKE TWO TABLETS BY MOUTH IN THE EVENING            Azul Lawton MA

## 2021-12-22 RX ORDER — DONEPEZIL HYDROCHLORIDE 5 MG/1
TABLET, FILM COATED ORAL
Qty: 90 TABLET | Refills: 1 | Status: SHIPPED | OUTPATIENT
Start: 2021-12-22 | End: 2022-06-13

## 2022-01-11 RX ORDER — DIVALPROEX SODIUM 125 MG/1
TABLET, DELAYED RELEASE ORAL
Qty: 60 TABLET | Refills: 1 | Status: SHIPPED | OUTPATIENT
Start: 2022-01-11 | End: 2022-01-13

## 2022-01-11 NOTE — TELEPHONE ENCOUNTER
January Ruiz called requesting a refill of the below medication which has been pended for you:     Requested Prescriptions     Pending Prescriptions Disp Refills    divalproex (DEPAKOTE) 125 MG DR tablet [Pharmacy Med Name: DIVALPROEX DELAYED RELEASE 125MG TB] 60 tablet 1     Sig: TAKE 1 TABLET BY MOUTH EVERY MORNING AND 1 TABLET BY MOUTH AT 3 PM       Last Appointment Date: 11/17/2021  Next Appointment Date: 11/23/2022    No Known Allergies

## 2022-01-13 RX ORDER — DIVALPROEX SODIUM 125 MG/1
TABLET, DELAYED RELEASE ORAL
Qty: 180 TABLET | Refills: 2 | Status: SHIPPED | OUTPATIENT
Start: 2022-01-13 | End: 2022-10-20

## 2022-01-19 DIAGNOSIS — F51.01 PRIMARY INSOMNIA: ICD-10-CM

## 2022-01-19 RX ORDER — ZOLPIDEM TARTRATE 5 MG/1
TABLET ORAL
Qty: 30 TABLET | Refills: 2 | Status: SHIPPED | OUTPATIENT
Start: 2022-01-19 | End: 2022-02-18

## 2022-01-19 NOTE — TELEPHONE ENCOUNTER
Juli Cloud called requesting a refill of the below medication which has been pended for you:     Requested Prescriptions     Pending Prescriptions Disp Refills    zolpidem (AMBIEN) 5 MG tablet [Pharmacy Med Name: ZOLPIDEM 5MG TABLETS] 30 tablet 2     Sig: TAKE 1 TABLET BY MOUTH EVERY NIGHT AS NEEDED FOR SLEEP       Last Appointment Date: 11/17/2021  Next Appointment Date: Visit date not found    No Known Allergies

## 2022-03-13 DIAGNOSIS — E78.2 MIXED HYPERLIPIDEMIA: Primary | ICD-10-CM

## 2022-03-14 RX ORDER — SIMVASTATIN 20 MG
TABLET ORAL
Qty: 90 TABLET | Refills: 1 | Status: SHIPPED | OUTPATIENT
Start: 2022-03-14 | End: 2022-06-16

## 2022-03-14 NOTE — TELEPHONE ENCOUNTER
Jaylen Eva called to request a refill on her medication.       Last office visit : 11/17/2021   Next office visit : 11/23/2022    Requested Prescriptions     Pending Prescriptions Disp Refills    simvastatin (ZOCOR) 20 MG tablet [Pharmacy Med Name: SIMVASTATIN 20MG TABLETS] 90 tablet 1     Sig: TAKE 1 TABLET BY [address] [place] [address]

## 2022-04-29 NOTE — TELEPHONE ENCOUNTER
Igor called requesting a refill of the below medication which has been pended for you:     Requested Prescriptions     Pending Prescriptions Disp Refills    omeprazole (PRILOSEC) 20 MG delayed release capsule [Pharmacy Med Name: OMEPRAZOLE 20MG CAPSULES] 180 capsule 1     Sig: TAKE 1 CAPSULE BY MOUTH TWICE DAILY       Last Appointment Date: 11/17/2021  Next Appointment Date: Visit date not found    No Known Allergies

## 2022-05-01 RX ORDER — OMEPRAZOLE 20 MG/1
CAPSULE, DELAYED RELEASE ORAL
Qty: 180 CAPSULE | Refills: 1 | Status: SHIPPED | OUTPATIENT
Start: 2022-05-01 | End: 2022-11-03

## 2022-05-03 RX ORDER — BUPROPION HYDROCHLORIDE 150 MG/1
150 TABLET ORAL EVERY MORNING
Qty: 30 TABLET | Refills: 3 | Status: SHIPPED | OUTPATIENT
Start: 2022-05-03

## 2022-05-03 NOTE — TELEPHONE ENCOUNTER
Viridiana Bran called requesting a refill of the below medication which has been pended for you:     Requested Prescriptions     Pending Prescriptions Disp Refills    buPROPion (WELLBUTRIN XL) 150 MG extended release tablet [Pharmacy Med Name: BUPROPION XL 150MG TABLETS (24 H)] 30 tablet 3     Sig: TAKE 1 TABLET BY MOUTH EVERY MORNING       Last Appointment Date: 11/17/2021  Next Appointment Date: Visit date not found    No Known Allergies

## 2022-05-10 DIAGNOSIS — I10 ESSENTIAL HYPERTENSION: ICD-10-CM

## 2022-05-10 RX ORDER — ENALAPRIL MALEATE AND HYDROCHLOROTHIAZIDE 10; 25 MG/1; MG/1
TABLET ORAL
Qty: 180 TABLET | Refills: 1 | Status: SHIPPED | OUTPATIENT
Start: 2022-05-10

## 2022-05-10 NOTE — TELEPHONE ENCOUNTER
Christina Vargas called requesting a refill of the below medication which has been pended for you:     Requested Prescriptions     Pending Prescriptions Disp Refills    enalapril-hydroCHLOROthiazide (VASERETIC) 10-25 MG per tablet [Pharmacy Med Name: ENALAPRIL-HCTZ 10-25MG TABLETS] 180 tablet 1     Sig: TAKE 1 TABLET BY MOUTH TWICE DAILY       Last Appointment Date: 11/17/2021  Next Appointment Date: Visit date not found    No Known Allergies

## 2022-05-26 DIAGNOSIS — F41.9 ANXIETY: ICD-10-CM

## 2022-05-26 RX ORDER — ESCITALOPRAM OXALATE 5 MG/1
TABLET ORAL
Qty: 90 TABLET | Refills: 1 | Status: SHIPPED | OUTPATIENT
Start: 2022-05-26

## 2022-05-26 NOTE — TELEPHONE ENCOUNTER
Maximilian Darnell called requesting a refill of the below medication which has been pended for you:     Requested Prescriptions     Pending Prescriptions Disp Refills    escitalopram (LEXAPRO) 5 MG tablet [Pharmacy Med Name: ESCITALOPRAM 5MG TABLETS] 90 tablet 1     Sig: TAKE 1 TABLET BY MOUTH EVERY NIGHT AT BEDTIME       Last Appointment Date: 11/17/2021  Next Appointment Date: 11/23/2022    No Known Allergies

## 2022-06-10 NOTE — TELEPHONE ENCOUNTER
Chandu Chau called requesting a refill of the below medication which has been pended for you:     Requested Prescriptions     Pending Prescriptions Disp Refills    donepezil (ARICEPT) 5 MG tablet [Pharmacy Med Name: DONEPEZIL 5MG TABLETS] 90 tablet 1     Sig: TAKE 1 TABLET BY MOUTH EVERY NIGHT       Last Appointment Date: 11/17/2021  Next Appointment Date: 11/23/2022    No Known Allergies

## 2022-06-13 RX ORDER — DONEPEZIL HYDROCHLORIDE 5 MG/1
TABLET, FILM COATED ORAL
Qty: 90 TABLET | Refills: 1 | Status: SHIPPED | OUTPATIENT
Start: 2022-06-13 | End: 2022-09-26

## 2022-06-16 DIAGNOSIS — E78.2 MIXED HYPERLIPIDEMIA: ICD-10-CM

## 2022-06-16 RX ORDER — SIMVASTATIN 20 MG
TABLET ORAL
Qty: 90 TABLET | Refills: 1 | Status: SHIPPED | OUTPATIENT
Start: 2022-06-16

## 2022-06-16 RX ORDER — METOPROLOL SUCCINATE 25 MG/1
TABLET, EXTENDED RELEASE ORAL
Qty: 45 TABLET | Refills: 1 | Status: SHIPPED | OUTPATIENT
Start: 2022-06-16

## 2022-06-16 NOTE — TELEPHONE ENCOUNTER
Shazia Eon called requesting a refill of the below medication which has been pended for you:     Requested Prescriptions     Pending Prescriptions Disp Refills    metoprolol succinate (TOPROL XL) 25 MG extended release tablet [Pharmacy Med Name: METOPROLOL ER SUCCINATE 25MG TABS] 45 tablet 1     Sig: TAKE 1/2 TABLET BY MOUTH DAILY    simvastatin (ZOCOR) 20 MG tablet [Pharmacy Med Name: SIMVASTATIN 20MG TABLETS] 90 tablet 1     Sig: TAKE 1 TABLET BY MOUTH DAILY       Last Appointment Date: 11/17/2021  Next Appointment Date: 11/23/2022    No Known Allergies

## 2022-07-05 RX ORDER — QUETIAPINE FUMARATE 50 MG/1
TABLET, FILM COATED ORAL
Qty: 270 TABLET | Refills: 1 | Status: SHIPPED | OUTPATIENT
Start: 2022-07-05 | End: 2022-10-10

## 2022-09-26 RX ORDER — DONEPEZIL HYDROCHLORIDE 5 MG/1
TABLET, FILM COATED ORAL
Qty: 90 TABLET | Refills: 1 | Status: SHIPPED | OUTPATIENT
Start: 2022-09-26

## 2022-10-04 NOTE — TELEPHONE ENCOUNTER
Change the dose to 50 mg quetiapine each evening (not  25 + 50) Left detailed message on self identified voicemail-Diagnosis on order gallbladder wall thickening ( Dr. Patel placed order August 20220

## 2022-10-10 RX ORDER — QUETIAPINE FUMARATE 50 MG/1
TABLET, FILM COATED ORAL
Qty: 270 TABLET | Refills: 1 | Status: SHIPPED | OUTPATIENT
Start: 2022-10-10

## 2022-10-10 NOTE — TELEPHONE ENCOUNTER
Igor called requesting a refill of the below medication which has been pended for you:     Requested Prescriptions     Pending Prescriptions Disp Refills    QUEtiapine (SEROQUEL) 50 MG tablet [Pharmacy Med Name: QUETIAPINE 50MG  TABLETS] 270 tablet 1     Sig: TAKE 1 TABLET BY MOUTH IN THE AFTERNOON AND TAKE TWO TABLETS BY MOUTH IN THE EVENING       Last Appointment Date: 11/17/2021  Next Appointment Date: 11/23/2022    No Known Allergies

## 2022-10-20 RX ORDER — DIVALPROEX SODIUM 125 MG/1
TABLET, DELAYED RELEASE ORAL
Qty: 180 TABLET | Refills: 2 | Status: SHIPPED | OUTPATIENT
Start: 2022-10-20

## 2022-11-03 RX ORDER — OMEPRAZOLE 20 MG/1
CAPSULE, DELAYED RELEASE ORAL
Qty: 180 CAPSULE | Refills: 1 | Status: SHIPPED | OUTPATIENT
Start: 2022-11-03 | End: 2022-11-23

## 2022-11-03 NOTE — TELEPHONE ENCOUNTER
Lakhwinder Miranda called requesting a refill of the below medication which has been pended for you:     Requested Prescriptions     Pending Prescriptions Disp Refills    omeprazole (PRILOSEC) 20 MG delayed release capsule [Pharmacy Med Name: OMEPRAZOLE 20MG CAPSULES] 180 capsule 1     Sig: TAKE 1 CAPSULE BY MOUTH TWICE DAILY       Last Appointment Date: 11/17/2021  Next Appointment Date: 11/23/2022    No Known Allergies

## 2022-11-10 DIAGNOSIS — Z00.00 MEDICARE ANNUAL WELLNESS VISIT, INITIAL: Primary | ICD-10-CM

## 2022-11-23 ENCOUNTER — OFFICE VISIT (OUTPATIENT)
Dept: INTERNAL MEDICINE | Age: 82
End: 2022-11-23
Payer: MEDICARE

## 2022-11-23 VITALS
TEMPERATURE: 97.5 F | SYSTOLIC BLOOD PRESSURE: 126 MMHG | DIASTOLIC BLOOD PRESSURE: 74 MMHG | WEIGHT: 177 LBS | HEART RATE: 68 BPM | HEIGHT: 65 IN | BODY MASS INDEX: 29.49 KG/M2

## 2022-11-23 DIAGNOSIS — F05 SUNDOWN SYNDROME: ICD-10-CM

## 2022-11-23 DIAGNOSIS — F41.9 ANXIETY: ICD-10-CM

## 2022-11-23 DIAGNOSIS — Z00.00 MEDICARE ANNUAL WELLNESS VISIT, INITIAL: ICD-10-CM

## 2022-11-23 DIAGNOSIS — I10 ESSENTIAL HYPERTENSION: ICD-10-CM

## 2022-11-23 DIAGNOSIS — F02.818 DEMENTIA ASSOCIATED WITH OTHER UNDERLYING DISEASE WITH BEHAVIORAL DISTURBANCE: ICD-10-CM

## 2022-11-23 DIAGNOSIS — M21.371 RIGHT FOOT DROP: ICD-10-CM

## 2022-11-23 DIAGNOSIS — E78.2 MIXED HYPERLIPIDEMIA: ICD-10-CM

## 2022-11-23 DIAGNOSIS — R32 URINARY INCONTINENCE, UNSPECIFIED TYPE: ICD-10-CM

## 2022-11-23 DIAGNOSIS — Z23 NEED FOR INFLUENZA VACCINATION: Primary | ICD-10-CM

## 2022-11-23 LAB
ALBUMIN SERPL-MCNC: 3.8 G/DL (ref 3.5–5.2)
ALP BLD-CCNC: 64 U/L (ref 35–104)
ALT SERPL-CCNC: 21 U/L (ref 5–33)
ANION GAP SERPL CALCULATED.3IONS-SCNC: 11 MMOL/L (ref 7–19)
AST SERPL-CCNC: 18 U/L (ref 5–32)
BASOPHILS ABSOLUTE: 0 K/UL (ref 0–0.2)
BASOPHILS RELATIVE PERCENT: 0.2 % (ref 0–1)
BILIRUB SERPL-MCNC: 0.3 MG/DL (ref 0.2–1.2)
BUN BLDV-MCNC: 19 MG/DL (ref 8–23)
CALCIUM SERPL-MCNC: 9.4 MG/DL (ref 8.8–10.2)
CHLORIDE BLD-SCNC: 99 MMOL/L (ref 98–111)
CHOLESTEROL, TOTAL: 158 MG/DL (ref 160–199)
CO2: 31 MMOL/L (ref 22–29)
CREAT SERPL-MCNC: 0.5 MG/DL (ref 0.5–0.9)
EOSINOPHILS ABSOLUTE: 0 K/UL (ref 0–0.6)
EOSINOPHILS RELATIVE PERCENT: 0.4 % (ref 0–5)
GFR SERPL CREATININE-BSD FRML MDRD: >60 ML/MIN/{1.73_M2}
GLUCOSE BLD-MCNC: 108 MG/DL (ref 74–109)
HCT VFR BLD CALC: 41.6 % (ref 37–47)
HDLC SERPL-MCNC: 66 MG/DL (ref 65–121)
HEMOGLOBIN: 13.7 G/DL (ref 12–16)
IMMATURE GRANULOCYTES #: 0 K/UL
LDL CHOLESTEROL CALCULATED: 72 MG/DL
LYMPHOCYTES ABSOLUTE: 2.5 K/UL (ref 1.1–4.5)
LYMPHOCYTES RELATIVE PERCENT: 28.3 % (ref 20–40)
MCH RBC QN AUTO: 30 PG (ref 27–31)
MCHC RBC AUTO-ENTMCNC: 32.9 G/DL (ref 33–37)
MCV RBC AUTO: 91.2 FL (ref 81–99)
MONOCYTES ABSOLUTE: 0.4 K/UL (ref 0–0.9)
MONOCYTES RELATIVE PERCENT: 4.5 % (ref 0–10)
NEUTROPHILS ABSOLUTE: 5.9 K/UL (ref 1.5–7.5)
NEUTROPHILS RELATIVE PERCENT: 66.2 % (ref 50–65)
PDW BLD-RTO: 13.2 % (ref 11.5–14.5)
PLATELET # BLD: 308 K/UL (ref 130–400)
PMV BLD AUTO: 10.6 FL (ref 9.4–12.3)
POTASSIUM SERPL-SCNC: 3.9 MMOL/L (ref 3.5–5)
RBC # BLD: 4.56 M/UL (ref 4.2–5.4)
SODIUM BLD-SCNC: 141 MMOL/L (ref 136–145)
TOTAL PROTEIN: 6.8 G/DL (ref 6.6–8.7)
TRIGL SERPL-MCNC: 100 MG/DL (ref 0–149)
TSH SERPL DL<=0.05 MIU/L-ACNC: 3.56 UIU/ML (ref 0.27–4.2)
VITAMIN D 25-HYDROXY: 42.5 NG/ML
WBC # BLD: 8.9 K/UL (ref 4.8–10.8)

## 2022-11-23 PROCEDURE — 99214 OFFICE O/P EST MOD 30 MIN: CPT | Performed by: NURSE PRACTITIONER

## 2022-11-23 PROCEDURE — 1123F ACP DISCUSS/DSCN MKR DOCD: CPT | Performed by: NURSE PRACTITIONER

## 2022-11-23 PROCEDURE — 0509F URINE INCON PLAN DOCD: CPT | Performed by: NURSE PRACTITIONER

## 2022-11-23 PROCEDURE — G8399 PT W/DXA RESULTS DOCUMENT: HCPCS | Performed by: NURSE PRACTITIONER

## 2022-11-23 PROCEDURE — 3078F DIAST BP <80 MM HG: CPT | Performed by: NURSE PRACTITIONER

## 2022-11-23 PROCEDURE — G0439 PPPS, SUBSEQ VISIT: HCPCS | Performed by: NURSE PRACTITIONER

## 2022-11-23 PROCEDURE — G8484 FLU IMMUNIZE NO ADMIN: HCPCS | Performed by: NURSE PRACTITIONER

## 2022-11-23 PROCEDURE — 3074F SYST BP LT 130 MM HG: CPT | Performed by: NURSE PRACTITIONER

## 2022-11-23 PROCEDURE — 1090F PRES/ABSN URINE INCON ASSESS: CPT | Performed by: NURSE PRACTITIONER

## 2022-11-23 PROCEDURE — G8417 CALC BMI ABV UP PARAM F/U: HCPCS | Performed by: NURSE PRACTITIONER

## 2022-11-23 PROCEDURE — G0008 ADMIN INFLUENZA VIRUS VAC: HCPCS | Performed by: NURSE PRACTITIONER

## 2022-11-23 PROCEDURE — 90694 VACC AIIV4 NO PRSRV 0.5ML IM: CPT | Performed by: NURSE PRACTITIONER

## 2022-11-23 PROCEDURE — G8427 DOCREV CUR MEDS BY ELIG CLIN: HCPCS | Performed by: NURSE PRACTITIONER

## 2022-11-23 PROCEDURE — 1036F TOBACCO NON-USER: CPT | Performed by: NURSE PRACTITIONER

## 2022-11-23 RX ORDER — ESCITALOPRAM OXALATE 5 MG/1
TABLET ORAL
Qty: 90 TABLET | Refills: 1 | Status: SHIPPED | OUTPATIENT
Start: 2022-11-23

## 2022-11-23 RX ORDER — SIMVASTATIN 20 MG
TABLET ORAL
Qty: 90 TABLET | Refills: 1 | Status: SHIPPED | OUTPATIENT
Start: 2022-11-23

## 2022-11-23 RX ORDER — METOPROLOL SUCCINATE 25 MG/1
TABLET, EXTENDED RELEASE ORAL
Qty: 45 TABLET | Refills: 1 | Status: SHIPPED | OUTPATIENT
Start: 2022-11-23

## 2022-11-23 RX ORDER — ENALAPRIL MALEATE AND HYDROCHLOROTHIAZIDE 10; 25 MG/1; MG/1
TABLET ORAL
Qty: 180 TABLET | Refills: 1 | Status: SHIPPED | OUTPATIENT
Start: 2022-11-23

## 2022-11-23 SDOH — ECONOMIC STABILITY: FOOD INSECURITY: WITHIN THE PAST 12 MONTHS, YOU WORRIED THAT YOUR FOOD WOULD RUN OUT BEFORE YOU GOT MONEY TO BUY MORE.: NEVER TRUE

## 2022-11-23 SDOH — ECONOMIC STABILITY: FOOD INSECURITY: WITHIN THE PAST 12 MONTHS, THE FOOD YOU BOUGHT JUST DIDN'T LAST AND YOU DIDN'T HAVE MONEY TO GET MORE.: NEVER TRUE

## 2022-11-23 ASSESSMENT — PATIENT HEALTH QUESTIONNAIRE - PHQ9
SUM OF ALL RESPONSES TO PHQ QUESTIONS 1-9: 0
1. LITTLE INTEREST OR PLEASURE IN DOING THINGS: 0
7. TROUBLE CONCENTRATING ON THINGS, SUCH AS READING THE NEWSPAPER OR WATCHING TELEVISION: 0
5. POOR APPETITE OR OVEREATING: 0
3. TROUBLE FALLING OR STAYING ASLEEP: 0
9. THOUGHTS THAT YOU WOULD BE BETTER OFF DEAD, OR OF HURTING YOURSELF: 0
6. FEELING BAD ABOUT YOURSELF - OR THAT YOU ARE A FAILURE OR HAVE LET YOURSELF OR YOUR FAMILY DOWN: 0
SUM OF ALL RESPONSES TO PHQ QUESTIONS 1-9: 0
2. FEELING DOWN, DEPRESSED OR HOPELESS: 0
8. MOVING OR SPEAKING SO SLOWLY THAT OTHER PEOPLE COULD HAVE NOTICED. OR THE OPPOSITE, BEING SO FIGETY OR RESTLESS THAT YOU HAVE BEEN MOVING AROUND A LOT MORE THAN USUAL: 0
10. IF YOU CHECKED OFF ANY PROBLEMS, HOW DIFFICULT HAVE THESE PROBLEMS MADE IT FOR YOU TO DO YOUR WORK, TAKE CARE OF THINGS AT HOME, OR GET ALONG WITH OTHER PEOPLE: 0
SUM OF ALL RESPONSES TO PHQ QUESTIONS 1-9: 0
SUM OF ALL RESPONSES TO PHQ9 QUESTIONS 1 & 2: 0
4. FEELING TIRED OR HAVING LITTLE ENERGY: 0
SUM OF ALL RESPONSES TO PHQ QUESTIONS 1-9: 0

## 2022-11-23 ASSESSMENT — ENCOUNTER SYMPTOMS
NAUSEA: 0
VOMITING: 0
STRIDOR: 0
WHEEZING: 0
COLOR CHANGE: 0
CHOKING: 0
SORE THROAT: 0
DIARRHEA: 0
BLOOD IN STOOL: 0
BACK PAIN: 1
EYE ITCHING: 0
EYE DISCHARGE: 0
SHORTNESS OF BREATH: 0
CONSTIPATION: 0
COUGH: 0
ABDOMINAL DISTENTION: 0
TROUBLE SWALLOWING: 0
ABDOMINAL PAIN: 0

## 2022-11-23 ASSESSMENT — LIFESTYLE VARIABLES
HOW OFTEN DO YOU HAVE A DRINK CONTAINING ALCOHOL: NEVER
HOW MANY STANDARD DRINKS CONTAINING ALCOHOL DO YOU HAVE ON A TYPICAL DAY: PATIENT DOES NOT DRINK

## 2022-11-23 ASSESSMENT — SOCIAL DETERMINANTS OF HEALTH (SDOH): HOW HARD IS IT FOR YOU TO PAY FOR THE VERY BASICS LIKE FOOD, HOUSING, MEDICAL CARE, AND HEATING?: NOT HARD AT ALL

## 2022-11-23 NOTE — PROGRESS NOTES
After obtaining written consent from patient and per orders of Dr. Yue Sanchez, injection of Fluad administered Intramuscular in Left deltoid by Panda Eddy. Patient tolerated well with no immediate reactions noted in office.

## 2022-11-23 NOTE — PROGRESS NOTES
[address] [facility]  [address] [address]  [address] [address]  Dept: [phone]  Dept Fax: [phone]: [phone]    Leona Mclaughlin (:  1940) is a 81 y.o. female,Established patient  na , here for evaluation of the following chief complaint(s): Medicare AW      Leona Mclaughlin is a 81 y.o. female who presents today for her medical conditions/complaints as noted below. Leona Mclaughlin is c/fouzia Medicare AWV        HPI:     Chief Complaint   Patient presents with    Medicare AWV     HPI   1. Obsession   she had been eating all the time but her daughter has gotten her mostly out of that habit;  she is staying alone now;  she gets meals on wheels;    2.  she has a Rollator walker that she can use in the house; she has had 2 falls but not had injury   3. Dementia; her  just recently  her daughter has been helping them and being at their house more than 50% of the time. She does all the finances all the meds. \  She will get going to her sons house in [place]; permanently and transition to NH on medicaid there;   4. Sundowners;  daughter fixes trays and and she takes meds herself;  the dauther has really dedicated herself to making her better;        33 minutes spent counseling her daughter Navneet Carlisle regarding nursing home placement assisted living placement. The plan is for her to go live with her son in [place] for a while to give Navneet Carlisle a break. They are looking into possibly assisted living or nursing homes in that area for her to stay indefinitely.   Past Medical History:   Diagnosis Date    Biliary dyskinesia     Esophageal reflux     Hypertension     Idiopathic peripheral neuropathy     right foot drop, atrophy    IFG (impaired fasting glucose)     Insomnia     Major depressive disorder     history of suicide attempt, years ago     Mixed hyperlipidemia     Obesity       Past Surgical History:   Procedure Laterality Date    CHOLECYSTECTOMY      COLONOSCOPY  08/24/2017    Dr Maria Luisa Chirinos repeat 5 years, diverticulosis       Vitals 11/23/2022 11/17/2021 6/22/2021 8/12/2020 2/6/2020 6/7/4047   SYSTOLIC 642 081 400 571 448 356   DIASTOLIC 74 64 60 79 82 86   Site Left Upper Arm - - - - -   Pulse 68 90 88 120 76 100   Temp 97.5 - 97.6 - - -   Resp - - - - - 18   SpO2 - 96 97 - 97 93   Weight 177 lb 215 lb 219 lb 223 lb - 230 lb   Height 5' 5\" 5' 5\" 5' 2\" 5' 5\" - 5' 5\"   Body mass index 29.45 kg/m2 35.77 kg/m2 40.05 kg/m2 37.11 kg/m2 - 38.27 kg/m2   Some recent data might be hidden       Family History   Problem Relation Age of Onset    COPD Mother     Diabetes Father        Social History     Tobacco Use    Smoking status: Never    Smokeless tobacco: Never   Substance Use Topics    Alcohol use: No      Current Outpatient Medications   Medication Sig Dispense Refill    metoprolol succinate (TOPROL XL) 25 MG extended release tablet TAKE 1/2 TABLET BY MOUTH DAILY 45 tablet 1    simvastatin (ZOCOR) 20 MG tablet TAKE 1 TABLET BY MOUTH DAILY 90 tablet 1    escitalopram (LEXAPRO) 5 MG tablet TAKE 1 TABLET BY MOUTH EVERY NIGHT AT BEDTIME 90 tablet 1    enalapril-hydroCHLOROthiazide (VASERETIC) 10-25 MG per tablet TAKE 1 TABLET BY MOUTH TWICE DAILY 180 tablet 1    divalproex (DEPAKOTE) 125 MG DR tablet TAKE ONE TABLET BY MOUTH EVERY MORNING AND TAKE ONE TABLET BY MOUTH DAILY AT 3PM AS DIRECTED 180 tablet 2    QUEtiapine (SEROQUEL) 50 MG tablet TAKE 1 TABLET BY MOUTH IN THE AFTERNOON AND TAKE TWO TABLETS BY MOUTH IN THE EVENING 270 tablet 1    donepezil (ARICEPT) 5 MG tablet TAKE 1 TABLET BY MOUTH EVERY NIGHT 90 tablet 1    mirabegron (MYRBETRIQ) 25 MG TB24 Take 1 tablet by mouth daily 30 tablet 1    calcium carbonate-vitamin D (CALTRATE) 600-400 MG-UNIT TABS per tab Take 1 tablet by mouth 2 times daily       No current facility-administered medications for this visit.      No Known Allergies    Health Maintenance   Topic Date Due DTaP/Tdap/Td vaccine (1 - Tdap) Never done    Shingles vaccine (1 of 2) Never done    COVID-19 Vaccine (3 - Booster for Pfizer series) 06/06/2021    Breast cancer screen  10/31/2021    Colorectal Cancer Screen  08/24/2022    Depression Monitoring  11/17/2022    Lipids  11/23/2023    Annual Wellness Visit (AWV)  11/24/2023    DEXA (modify frequency per FRAX score)  Completed    Flu vaccine  Completed    Pneumococcal 65+ years Vaccine  Completed    Hepatitis A vaccine  Aged Out    Hib vaccine  Aged Out    Meningococcal (ACWY) vaccine  Aged Out       Lab Results   Component Value Date    LABA1C 5.8 02/05/2019     No results found for: PSA, PSADIA  TSH   Date Value Ref Range Status   11/23/2022 3.560 0.270 - 4.200 uIU/mL Final   ]  Lab Results   Component Value Date     11/23/2022    K 3.9 11/23/2022    CL 99 11/23/2022    CO2 31 (H) 11/23/2022    BUN 19 11/23/2022    CREATININE 0.5 11/23/2022    GLUCOSE 108 11/23/2022    CALCIUM 9.4 11/23/2022    PROT 6.8 11/23/2022    LABALBU 3.8 11/23/2022    BILITOT 0.3 11/23/2022    ALKPHOS 64 11/23/2022    AST 18 11/23/2022    ALT 21 11/23/2022    LABGLOM >60 11/23/2022    GFRAA >59 11/17/2021     Lab Results   Component Value Date    CHOL 158 (L) 11/23/2022    CHOL 167 11/17/2021    CHOL 194 11/02/2018     Lab Results   Component Value Date    TRIG 100 11/23/2022    TRIG 145 11/17/2021    TRIG 147 11/02/2018     Lab Results   Component Value Date    HDL 66 11/23/2022    HDL 60 (L) 11/17/2021    HDL 63 (L) 11/02/2018     Lab Results   Component Value Date    LDLCALC 72 11/23/2022    LDLCALC 78 11/17/2021    LDLCALC 102 11/02/2018     Lab Results   Component Value Date/Time     11/23/2022 11:28 AM    K 3.9 11/23/2022 11:28 AM    CL 99 11/23/2022 11:28 AM    CO2 31 11/23/2022 11:28 AM    BUN 19 11/23/2022 11:28 AM    CREATININE 0.5 11/23/2022 11:28 AM    GLUCOSE 108 11/23/2022 11:28 AM    CALCIUM 9.4 11/23/2022 11:28 AM      Lab Results   Component Value Date    WBC 8.9 11/23/2022    HGB 13.7 11/23/2022    HCT 41.6 11/23/2022    MCV 91.2 11/23/2022     11/23/2022    LYMPHOPCT 28.3 11/23/2022    RBC 4.56 11/23/2022    MCH 30.0 11/23/2022    MCHC 32.9 (L) 11/23/2022    RDW 13.2 11/23/2022     Lab Results   Component Value Date    VITD25 42.5 11/23/2022     Labs reviewed from day    Subjective:      Review of Systems   Constitutional:  Negative for fatigue, fever and unexpected weight change. HENT:  Positive for hearing loss. Negative for ear discharge, ear pain, mouth sores, sore throat and trouble swallowing. Has growth on left upper gum;   family is just going to watch    Eyes:  Negative for discharge, itching and visual disturbance. Respiratory:  Negative for cough, choking, shortness of breath, wheezing and stridor. Cardiovascular:  Negative for chest pain, palpitations and leg swelling. Gastrointestinal:  Negative for abdominal distention, abdominal pain, blood in stool, constipation, diarrhea, nausea and vomiting. Occasional loose stools    Endocrine: Negative for cold intolerance, polydipsia and polyuria. Genitourinary:  Negative for difficulty urinating, dysuria, frequency and urgency. Incontinent   Musculoskeletal:  Positive for arthralgias and back pain. Negative for gait problem. Skin:  Negative for color change and rash. Allergic/Immunologic: Negative for food allergies and immunocompromised state. Neurological:  Negative for dizziness, tremors, syncope, speech difficulty, weakness and headaches. Foot on the right from fall many years ago and she wears a brace for foot drop    Hematological:  Negative for adenopathy. Does not bruise/bleed easily. Psychiatric/Behavioral:  Positive for sleep disturbance. Negative for confusion. The patient is nervous/anxious. Obsessions     Objective:     Physical Exam  Constitutional:       General: She is not in acute distress. Appearance: She is well-developed.    HENT: Head: Normocephalic and atraumatic. Ears:      Comments: She is totally deaf  Eyes:      General: No scleral icterus. Right eye: No discharge. Left eye: No discharge. Pupils: Pupils are equal, round, and reactive to light. Neck:      Thyroid: No thyromegaly. Vascular: No JVD. Cardiovascular:      Rate and Rhythm: Normal rate and regular rhythm. Heart sounds: Normal heart sounds. No murmur heard. Pulmonary:      Effort: Pulmonary effort is normal. No respiratory distress. Breath sounds: Normal breath sounds. No wheezing or rales. Abdominal:      General: Bowel sounds are normal. There is no distension. Palpations: Abdomen is soft. There is no mass. Tenderness: There is no abdominal tenderness. There is no guarding or rebound. Musculoskeletal:         General: No tenderness. Normal range of motion. Cervical back: Normal range of motion and neck supple. Skin:     General: Skin is warm and dry. Findings: No erythema or rash. Neurological:      Mental Status: She is alert and oriented to person, place, and time. Cranial Nerves: No cranial nerve deficit. Coordination: Coordination normal.      Deep Tendon Reflexes: Reflexes are normal and symmetric. Reflexes normal.   Psychiatric:         Mood and Affect: Mood is not depressed. Behavior: Behavior normal.         Thought Content:  Thought content normal.         Judgment: Judgment normal.      Comments: She is little more interactive; now that she has hearing aids   Dementia  she had periods of confusion;       /74 (Site: Left Upper Arm)   Pulse 68   Temp 97.5 °F (36.4 °C)   Ht 5' 5\" (1.651 m)   Wt 177 lb (80.3 kg)   BMI 29.45 kg/m²           Assessment:      Problem List       Anxiety    Relevant Medications    escitalopram (LEXAPRO) 5 MG tablet    Dementia associated with other underlying disease with behavioral disturbance     With obsessions          Relevant Medications    donepezil (ARICEPT) 5 MG tablet    QUEtiapine (SEROQUEL) 50 MG tablet    divalproex (DEPAKOTE) 125 MG DR tablet    escitalopram (LEXAPRO) 5 MG tablet    Essential hypertension    Relevant Medications    enalapril-hydroCHLOROthiazide (VASERETIC) 10-25 MG per tablet    Mixed hyperlipidemia    Relevant Medications    metoprolol succinate (TOPROL XL) 25 MG extended release tablet    simvastatin (ZOCOR) 20 MG tablet    enalapril-hydroCHLOROthiazide (VASERETIC) 10-25 MG per tablet    Right foot drop     This is from an old injury when she tried to commit suicide decades ago she jumped from the bridge          Warren syndrome     The Seroquel is working well          Urinary incontinence     This is an ongoing issue for her          Relevant Medications    mirabegron (MYRBETRIQ) 25 MG TB24     Plan:        Patient given educational materials - see patient instructions. Discussed use, benefit, and side effects of prescribed medications. Allpatient questions answered. Pt voiced understanding. Reviewed health maintenance. Instructed to continue current medications, diet and exercise. Patient agreed with treatment plan. Follow up as directed. MEDICATIONS:  Orders Placed This Encounter   Medications    metoprolol succinate (TOPROL XL) 25 MG extended release tablet     Sig: TAKE 1/2 TABLET BY MOUTH DAILY     Dispense:  45 tablet     Refill:  1     ZERO refills remain on this prescription. Your patient is requesting advance approval of refills for this medication to PREVENT ANY MISSED DOSES    simvastatin (ZOCOR) 20 MG tablet     Sig: TAKE 1 TABLET BY MOUTH DAILY     Dispense:  90 tablet     Refill:  1     ZERO refills remain on this prescription.  Your patient is requesting advance approval of refills for this medication to PREVENT ANY MISSED DOSES    escitalopram (LEXAPRO) 5 MG tablet     Sig: TAKE 1 TABLET BY MOUTH EVERY NIGHT AT BEDTIME     Dispense:  90 tablet     Refill:  1     ZERO refills remain on this prescription. Your patient is requesting advance approval of refills for this medication to PREVENT ANY MISSED DOSES    enalapril-hydroCHLOROthiazide (VASERETIC) 10-25 MG per tablet     Sig: TAKE 1 TABLET BY MOUTH TWICE DAILY     Dispense:  180 tablet     Refill:  1     ZERO refills remain on this prescription. Your patient is requesting advance approval of refills for this medication to [address] [place] [address]:  Orders Placed This Encounter   Procedures    Influenza, FLUAD, (age 72 y+), IM, Preservative Free, 0.5 mL         Follow-up:  No follow-ups on file. PATIENT INSTRUCTIONS:  There are no Patient Instructions on file for this visit. Electronically signed by BETH Tim on 11/23/2022 at 1:55 PM    @On this date 11/17/2021 I have spent 40 minutes reviewing previous notes, test results and face to face with the patient discussing the diagnosis and importance of compliance with the treatment plan as well as documenting on the day of the visit. EMRDragon/transcription disclaimer:  Much of this encounter note is electronic transcription/translation of spoken language to printed texts. The electronic translation of spoken language may be erroneous, or at times,nonsensical words or phrases may be inadvertently transcribed. Although I have reviewed the note for such errors, some may still exist.      Recommendations for Preventive Services Due: see orders and patient instructions/AVS.  . Recommended screening schedule for the next 5-10 years is provided to the patient in written form: see Patient Instructions/AVS.    Need for influenza vaccination  -     Influenza, FLUAD, (age 72 y+), IM, Preservative Free, 0.5 mL  Mixed hyperlipidemia  -     simvastatin (ZOCOR) 20 MG tablet; TAKE 1 TABLET BY MOUTH DAILY, Disp-90 tablet, R-1ZERO refills remain on this prescription.  Your patient is requesting advance approval of refills for this medication to [facility] DOSESNormal  Anxiety  -     escitalopram (LEXAPRO) 5 MG tablet; TAKE 1 TABLET BY MOUTH EVERY NIGHT AT BEDTIME, Disp-90 tablet, R-1ZERO refills remain on this prescription. Your patient is requesting advance approval of refills for this medication to [address]  Essential hypertension  -     enalapril-hydroCHLOROthiazide (VASERETIC) 10-25 MG per tablet; TAKE 1 TABLET BY MOUTH TWICE DAILY, Disp-180 tablet, R-1ZERO refills remain on this prescription. Your patient is requesting advance approval of refills for this medication to [address] [facility]  Dementia associated with other underlying disease with behavioral disturbance  Assessment & Plan: With obsessions   SunDown syndrome  Assessment & Plan:  The Seroquel is working well     Urinary incontinence, unspecified type  Assessment & Plan: This is an ongoing issue for her   Right foot drop  Assessment & Plan: This is from an old injury when she tried to commit suicide decades ago she jumped from the bridge Medicare Annual Wellness Visit    Denise Miller is here for Medicare AWV    Assessment & Plan   Need for influenza vaccination  -     Influenza, FLUAD, (age 72 y+), IM, Preservative Free, 0.5 mL  Mixed hyperlipidemia  -     simvastatin (ZOCOR) 20 MG tablet; TAKE 1 TABLET BY MOUTH DAILY, Disp-90 tablet, R-1ZERO refills remain on this prescription. Your patient is requesting advance approval of refills for this medication to [address] [address]  -     escitalopram (LEXAPRO) 5 MG tablet; TAKE 1 TABLET BY MOUTH EVERY NIGHT AT BEDTIME, Disp-90 tablet, R-1ZERO refills remain on this prescription. Your patient is requesting advance approval of refills for this medication to [facility]  Essential hypertension  -     enalapril-hydroCHLOROthiazide (VASERETIC) 10-25 MG per tablet; TAKE 1 TABLET BY MOUTH TWICE DAILY, Disp-180 tablet, R-1ZERO refills remain on this prescription.  Your patient is requesting advance approval of refills for this medication to [address] [place] [address] Rd  Dementia associated with other underlying disease with behavioral disturbance  Assessment & Plan: With obsessions   SunDown syndrome  Assessment & Plan:  The Seroquel is working well     Urinary incontinence, unspecified type  Assessment & Plan: This is an ongoing issue for her   Right foot drop  Assessment & Plan: This is from an old injury when she tried to commit suicide decades ago she jumped from the bridge     Recommendations for Preventive Services Due: see orders and patient instructions/AVS.  Recommended screening schedule for the next 5-10 years is provided to the patient in written form: see Patient Instructions/AVS.     No follow-ups on file. Subjective       Patient's complete Health Risk Assessment and screening values have been reviewed and are found in Flowsheets. The following problems were reviewed today and where indicated follow up appointments were made and/or referrals ordered. Positive Risk Factor Screenings with Interventions:    Fall Risk:  Do you feel unsteady or are you worried about falling? : (!) yes  2 or more falls in past year?: (!) yes  Fall with injury in past year?: no   Fall Risk Interventions:    Patient declines any further evaluation/treatment for this issue    Cognitive:   Words recalled: 1 Word Recalled  Clock Drawing Test (CDT): (!) Abnormal  Total Score Interpretation: Abnormal Mini-Cog  Cognitive Impairment Interventions:  Paras takes care of her            General Health and ACP:  General  In general, how would you say your health is?: Good  In the past 7 days, have you experienced any of the following: New or Increased Pain, New or Increased Fatigue, Loneliness, Social Isolation, Stress or Anger?: (!) Yes  Select all that apply: (!) Anger  Do you get the social and emotional support that you need?: Yes  Do you have a Living Will?: Yes    Advance Directives       Power of  Living Will ACP-Advance Directive ACP-Power of     Not on File Not on File Not on File Not on [address] [facility] Interventions:  Stress: na  Anger: na    Health Habits/Nutrition:  Physical Activity: Inactive    Days of Exercise per Week: 0 days    Minutes of Exercise per Session: 0 min     Have you lost any weight without trying in the past 3 months?: No  Body mass index: (!) 29.45  Have you seen the dentist within the past year?: (!) No  Health Habits/Nutrition Interventions:  Has lost 50 pounds doing well       Has hearing aids        ADLs:  In the past 7 days, did you need help from others to perform any of the following everyday activities: Eating, dressing, grooming, bathing, toileting, or walking/balance?: (!) Yes  Select all that apply: (!) Dressing, Grooming, Bathing, Toileting, Walking/Balance  In the past 7 days, did you need help from others to take care of any of the following: Laundry, housekeeping, banking/finances, shopping, telephone use, food preparation, transportation, or taking medications?: (!) Yes  Select all that apply: [employer], Housekeeping, Banking/Finances, Shopping, Food Preparation, Transportation, Taking Medications  ADL Interventions:  Daughter takes care of her           Objective   Vitals:    11/23/22 1056   BP: 126/74   Site: Left Upper Arm   Pulse: 68   Temp: 97.5 °F (36.4 °C)   Weight: 177 lb (80.3 kg)   Height: 5' 5\" (1.651 m)      Body mass index is 29.45 kg/m². 2       No Known Allergies  Prior to Visit Medications    Medication Sig Taking?  Authorizing Provider   metoprolol succinate (TOPROL XL) 25 MG extended release tablet TAKE 1/2 TABLET BY MOUTH DAILY Yes Longdrdomo Salas APRN   simvastatin (ZOCOR) 20 MG tablet TAKE 1 TABLET BY MOUTH DAILY Yes Fredrdomo Salas APRN   escitalopram (LEXAPRO) 5 MG tablet TAKE 1 TABLET BY MOUTH EVERY NIGHT AT BEDTIME Yes Fredrica Josue APRN   enalapril-hydroCHLOROthiazide (VASERETIC) 10-25 MG per tablet TAKE 1 TABLET BY MOUTH TWICE DAILY Yes BETH Reese   divalproex (DEPAKOTE) 125 MG DR tablet TAKE ONE TABLET BY MOUTH EVERY MORNING AND TAKE ONE TABLET BY MOUTH DAILY AT 3PM AS DIRECTED Yes BETH Reese   QUEtiapine (SEROQUEL) 50 MG tablet TAKE 1 TABLET BY MOUTH IN THE AFTERNOON AND TAKE TWO TABLETS BY MOUTH IN THE EVENING Yes BETH Reese   donepezil (ARICEPT) 5 MG tablet TAKE 1 TABLET BY MOUTH EVERY NIGHT Yes BETH Reese   mirabegron (MYRBETRIQ) 25 MG TB24 Take 1 tablet by mouth daily Yes BETH Reese   calcium carbonate-vitamin D (CALTRATE) 600-400 MG-UNIT TABS per tab Take 1 tablet by mouth 2 times daily Yes Historical Provider, MD Tamayo (Including outside providers/suppliers regularly involved in providing care):   Patient Care Team:  BETH Reese as PCP - General (Nurse Practitioner Acute Care)  BETH Reese as PCP - [facility] Empaneled Provider     Reviewed and updated this visit:  Allergies  Meds

## 2022-12-04 DIAGNOSIS — F41.9 ANXIETY: ICD-10-CM

## 2022-12-04 DIAGNOSIS — I10 ESSENTIAL HYPERTENSION: ICD-10-CM

## 2022-12-05 RX ORDER — ENALAPRIL MALEATE AND HYDROCHLOROTHIAZIDE 10; 25 MG/1; MG/1
TABLET ORAL
Qty: 180 TABLET | Refills: 1 | Status: SHIPPED | OUTPATIENT
Start: 2022-12-05

## 2022-12-05 RX ORDER — ESCITALOPRAM OXALATE 5 MG/1
TABLET ORAL
Qty: 90 TABLET | Refills: 1 | Status: SHIPPED | OUTPATIENT
Start: 2022-12-05

## 2022-12-05 NOTE — TELEPHONE ENCOUNTER
Nazanin Gonzalez called requesting a refill of the below medication which has been pended for you:     Requested Prescriptions     Pending Prescriptions Disp Refills    enalapril-hydroCHLOROthiazide (VASERETIC) 10-25 MG per tablet [Pharmacy Med Name: ENALAPRIL-HCTZ 10-25MG TABLETS] 180 tablet 1     Sig: TAKE 1 TABLET BY MOUTH TWICE DAILY    escitalopram (LEXAPRO) 5 MG tablet [Pharmacy Med Name: ESCITALOPRAM 5MG TABLETS] 90 tablet 1     Sig: TAKE 1 TABLET BY MOUTH EVERY NIGHT AT BEDTIME       Last Appointment Date: 11/23/2022  Next Appointment Date: Visit date not found    No Known Allergies

## 2022-12-20 DIAGNOSIS — F41.9 ANXIETY: ICD-10-CM

## 2022-12-20 DIAGNOSIS — E78.2 MIXED HYPERLIPIDEMIA: ICD-10-CM

## 2022-12-20 DIAGNOSIS — I10 ESSENTIAL HYPERTENSION: ICD-10-CM

## 2022-12-20 RX ORDER — METOPROLOL SUCCINATE 25 MG/1
TABLET, EXTENDED RELEASE ORAL
Qty: 45 TABLET | Refills: 1 | Status: SHIPPED | OUTPATIENT
Start: 2022-12-20

## 2022-12-20 RX ORDER — ENALAPRIL MALEATE AND HYDROCHLOROTHIAZIDE 10; 25 MG/1; MG/1
TABLET ORAL
Qty: 180 TABLET | Refills: 1 | Status: SHIPPED | OUTPATIENT
Start: 2022-12-20

## 2022-12-20 RX ORDER — DONEPEZIL HYDROCHLORIDE 5 MG/1
TABLET, FILM COATED ORAL
Qty: 90 TABLET | Refills: 1 | Status: SHIPPED | OUTPATIENT
Start: 2022-12-20

## 2022-12-20 RX ORDER — SIMVASTATIN 20 MG
TABLET ORAL
Qty: 90 TABLET | Refills: 1 | Status: SHIPPED | OUTPATIENT
Start: 2022-12-20

## 2022-12-20 RX ORDER — QUETIAPINE FUMARATE 50 MG/1
TABLET, FILM COATED ORAL
Qty: 270 TABLET | Refills: 1 | Status: SHIPPED | OUTPATIENT
Start: 2022-12-20

## 2022-12-20 RX ORDER — DIVALPROEX SODIUM 125 MG/1
TABLET, DELAYED RELEASE ORAL
Qty: 180 TABLET | Refills: 2 | Status: SHIPPED | OUTPATIENT
Start: 2022-12-20

## 2022-12-20 RX ORDER — ESCITALOPRAM OXALATE 5 MG/1
TABLET ORAL
Qty: 90 TABLET | Refills: 1 | Status: SHIPPED | OUTPATIENT
Start: 2022-12-20

## 2022-12-20 NOTE — TELEPHONE ENCOUNTER
Lindsay Guardado called requesting a refill of the below medication which has been pended for you:     Requested Prescriptions     Pending Prescriptions Disp Refills    divalproex (DEPAKOTE) 125 MG DR tablet 180 tablet 2     Sig: TAKE ONE TABLET BY MOUTH EVERY MORNING AND TAKE ONE TABLET BY MOUTH DAILY AT 3PM AS DIRECTED    donepezil (ARICEPT) 5 MG tablet 90 tablet 1     Sig: TAKE 1 TABLET BY MOUTH EVERY NIGHT    enalapril-hydroCHLOROthiazide (VASERETIC) 10-25 MG per tablet 180 tablet 1     Sig: TAKE 1 TABLET BY MOUTH TWICE DAILY    escitalopram (LEXAPRO) 5 MG tablet 90 tablet 1     Sig: TAKE 1 TABLET BY MOUTH EVERY NIGHT AT BEDTIME    metoprolol succinate (TOPROL XL) 25 MG extended release tablet 45 tablet 1     Sig: TAKE 1/2 TABLET BY MOUTH DAILY    mirabegron (MYRBETRIQ) 25 MG TB24 30 tablet 1     Sig: Take 1 tablet by mouth daily    QUEtiapine (SEROQUEL) 50 MG tablet 270 tablet 1     Sig: TAKE 1 TABLET BY MOUTH IN THE AFTERNOON AND TAKE TWO TABLETS BY MOUTH IN THE EVENING    simvastatin (ZOCOR) 20 MG tablet 90 tablet 1     Sig: TAKE 1 TABLET BY MOUTH DAILY       Last Appointment Date: 11/23/2022  Next Appointment Date: Visit date not found    No Known Allergies

## 2023-04-24 ENCOUNTER — TELEPHONE (OUTPATIENT)
Dept: INTERNAL MEDICINE | Age: 83
End: 2023-04-24

## 2023-05-10 RX ORDER — OMEPRAZOLE 20 MG/1
CAPSULE, DELAYED RELEASE ORAL
Qty: 180 CAPSULE | Refills: 1 | OUTPATIENT
Start: 2023-05-10

## 2023-11-22 NOTE — TELEPHONE ENCOUNTER
Giselle Pacheco called requesting a refill of the below medication which has been pended for you:     Requested Prescriptions     Pending Prescriptions Disp Refills    omeprazole (PRILOSEC) 20 MG delayed release capsule 180 capsule 3     Sig: TAKE 1 CAPSULE BY MOUTH TWICE DAILY       Last Appointment Date: 6/22/2021  Next Appointment Date: 11/17/2021    No Known Allergies
no

## 2023-11-30 NOTE — TELEPHONE ENCOUNTER
Vesna Snow called requesting a refill of the below medication which has been pended for you:     Requested Prescriptions     Pending Prescriptions Disp Refills    divalproex (DEPAKOTE) 125 MG DR tablet [Pharmacy Med Name: DIVALPROEX DELAYED RELEASE 125MG TB] 180 tablet 2     Sig: TAKE 1 TABLET BY MOUTH EVERY MORNING AND 1 TABLET DAILY AT 3PM AS DIRECTED       Last Appointment Date: 11/17/2021  Next Appointment Date: Visit date not found    No Known Allergies vision changes/numbness

## 2024-01-24 NOTE — ADDENDUM NOTE
Addended by: Sarina Clifton on: 11/17/2021 12:00 PM     Modules accepted: Orders on the discharge service for the patient. I have reviewed and made amendments to the documentation where necessary.

## (undated) DEVICE — ENDOGATOR AUXILIARY WATER JET CONNECTOR: Brand: ENDOGATOR

## (undated) DEVICE — SENSR O2 OXIMAX FNGR A/ 18IN NONSTR

## (undated) DEVICE — MSK O2 MD CONCENTR A/ LF 7FT 1P/U

## (undated) DEVICE — THE CHANNEL CLEANING BRUSH IS A NYLON FLEXI BRUSH ATTACHED TO A FLEXIBLE PLASTIC SHEATH DESIGNED TO SAFELY REMOVE DEBRIS FROM FLEXIBLE ENDOSCOPES.

## (undated) DEVICE — Device: Brand: DEFENDO AIR/WATER/SUCTION AND BIOPSY VALVE

## (undated) DEVICE — TBG SMPL FLTR LINE NASL 02/C02 A/ BX/100